# Patient Record
Sex: MALE | Race: OTHER
[De-identification: names, ages, dates, MRNs, and addresses within clinical notes are randomized per-mention and may not be internally consistent; named-entity substitution may affect disease eponyms.]

---

## 2017-06-27 ENCOUNTER — HOSPITAL ENCOUNTER (OUTPATIENT)
Dept: HOSPITAL 76 - LAB.N | Age: 22
End: 2017-06-27
Attending: NURSE PRACTITIONER
Payer: MEDICAID

## 2017-06-27 DIAGNOSIS — Z13.9: Primary | ICD-10-CM

## 2017-06-27 DIAGNOSIS — F32.9: ICD-10-CM

## 2017-06-27 LAB
ALBUMIN/GLOB SERPL: 1.7 {RATIO} (ref 1–2.2)
ANION GAP SERPL CALCULATED.4IONS-SCNC: 7 MMOL/L (ref 6–13)
BASOPHILS NFR BLD AUTO: 0.1 10^3/UL (ref 0–0.1)
BASOPHILS NFR BLD AUTO: 1.2 %
BILIRUB BLD-MCNC: 1 MG/DL (ref 0.2–1)
BUN SERPL-MCNC: 13 MG/DL (ref 6–20)
CALCIUM UR-MCNC: 9.4 MG/DL (ref 8.5–10.3)
CHLORIDE SERPL-SCNC: 103 MMOL/L (ref 101–111)
CO2 SERPL-SCNC: 28 MMOL/L (ref 21–32)
CREAT SERPLBLD-SCNC: 0.8 MG/DL (ref 0.6–1.2)
EOSINOPHIL # BLD AUTO: 0.1 10^3/UL (ref 0–0.7)
EOSINOPHIL NFR BLD AUTO: 2.5 %
ERYTHROCYTE [DISTWIDTH] IN BLOOD BY AUTOMATED COUNT: 13.5 % (ref 12–15)
GFRSERPLBLD MDRD-ARVRAT: 121 ML/MIN/{1.73_M2} (ref 89–?)
GLOBULIN SER-MCNC: 2.5 G/DL (ref 2.1–4.2)
GLUCOSE SERPL-MCNC: 98 MG/DL (ref 70–100)
HCT VFR BLD AUTO: 43.6 % (ref 42–52)
HGB UR QL STRIP: 14.7 G/DL (ref 14–18)
LYMPHOCYTES # SPEC AUTO: 1.1 10^3/UL (ref 1.5–3.5)
LYMPHOCYTES NFR BLD AUTO: 24.7 %
MCH RBC QN AUTO: 29.5 PG (ref 27–31)
MCHC RBC AUTO-ENTMCNC: 33.6 G/DL (ref 32–36)
MCV RBC AUTO: 87.6 FL (ref 80–94)
MONOCYTES # BLD AUTO: 0.7 10^3/UL (ref 0–1)
MONOCYTES NFR BLD AUTO: 14.6 %
NEUTROPHILS # BLD AUTO: 2.6 10^3/UL (ref 1.5–6.6)
NEUTROPHILS # SNV AUTO: 4.6 X10^3/UL (ref 4.8–10.8)
NEUTROPHILS NFR BLD AUTO: 57 %
NRBC # BLD AUTO: 0.1 /100WBC
PDW BLD AUTO: 8.6 FL (ref 7.4–11.4)
POTASSIUM SERPL-SCNC: 3.8 MMOL/L (ref 3.5–5)
PROT SPEC-MCNC: 6.8 G/DL (ref 6.7–8.2)
RBC MAR: 4.98 10^6/UL (ref 4.7–6.1)
SODIUM SERPLBLD-SCNC: 138 MMOL/L (ref 135–145)
WBC # BLD: 4.6 X10^3/UL

## 2017-06-27 PROCEDURE — 84403 ASSAY OF TOTAL TESTOSTERONE: CPT

## 2017-06-27 PROCEDURE — 80053 COMPREHEN METABOLIC PANEL: CPT

## 2017-06-27 PROCEDURE — 85025 COMPLETE CBC W/AUTO DIFF WBC: CPT

## 2017-06-27 PROCEDURE — 36415 COLL VENOUS BLD VENIPUNCTURE: CPT

## 2017-12-14 ENCOUNTER — HOSPITAL ENCOUNTER (OUTPATIENT)
Dept: HOSPITAL 76 - LAB.N | Age: 22
Discharge: HOME | End: 2017-12-14
Attending: NURSE PRACTITIONER
Payer: MEDICAID

## 2017-12-14 DIAGNOSIS — K62.5: Primary | ICD-10-CM

## 2017-12-14 LAB
BASOPHILS NFR BLD AUTO: 0 10^3/UL (ref 0–0.1)
BASOPHILS NFR BLD AUTO: 0.9 %
EOSINOPHIL # BLD AUTO: 0.1 10^3/UL (ref 0–0.7)
EOSINOPHIL NFR BLD AUTO: 3.1 %
ERYTHROCYTE [DISTWIDTH] IN BLOOD BY AUTOMATED COUNT: 13.7 % (ref 12–15)
HCT VFR BLD AUTO: 39.3 % (ref 42–52)
HGB UR QL STRIP: 13.8 G/DL (ref 14–18)
LYMPHOCYTES # SPEC AUTO: 1.2 10^3/UL (ref 1.5–3.5)
LYMPHOCYTES NFR BLD AUTO: 24.4 %
MCH RBC QN AUTO: 30.2 PG (ref 27–31)
MCHC RBC AUTO-ENTMCNC: 35.1 G/DL (ref 32–36)
MCV RBC AUTO: 86 FL (ref 80–94)
MONOCYTES # BLD AUTO: 0.7 10^3/UL (ref 0–1)
MONOCYTES NFR BLD AUTO: 14.4 %
NEUTROPHILS # BLD AUTO: 2.7 10^3/UL (ref 1.5–6.6)
NEUTROPHILS # SNV AUTO: 4.8 X10^3/UL (ref 4.8–10.8)
NEUTROPHILS NFR BLD AUTO: 57.2 %
NRBC # BLD AUTO: 0 /100WBC
PDW BLD AUTO: 8.4 FL (ref 7.4–11.4)
RBC MAR: 4.57 10^6/UL (ref 4.7–6.1)
WBC # BLD: 4.8 X10^3/UL

## 2017-12-14 PROCEDURE — 36415 COLL VENOUS BLD VENIPUNCTURE: CPT

## 2017-12-14 PROCEDURE — 85025 COMPLETE CBC W/AUTO DIFF WBC: CPT

## 2018-03-05 ENCOUNTER — HOSPITAL ENCOUNTER (OUTPATIENT)
Dept: HOSPITAL 76 - SDS | Age: 23
Discharge: HOME | End: 2018-03-05
Attending: SURGERY
Payer: MEDICAID

## 2018-03-05 VITALS — SYSTOLIC BLOOD PRESSURE: 105 MMHG | DIASTOLIC BLOOD PRESSURE: 67 MMHG

## 2018-03-05 DIAGNOSIS — F84.0: ICD-10-CM

## 2018-03-05 DIAGNOSIS — K64.8: ICD-10-CM

## 2018-03-05 DIAGNOSIS — K62.5: Primary | ICD-10-CM

## 2018-03-05 DIAGNOSIS — K52.89: ICD-10-CM

## 2018-03-05 DIAGNOSIS — F90.9: ICD-10-CM

## 2018-03-05 PROCEDURE — 0DBP8ZX EXCISION OF RECTUM, VIA NATURAL OR ARTIFICIAL OPENING ENDOSCOPIC, DIAGNOSTIC: ICD-10-PCS | Performed by: SURGERY

## 2018-03-05 PROCEDURE — 45380 COLONOSCOPY AND BIOPSY: CPT

## 2018-03-05 PROCEDURE — 0DBK8ZX EXCISION OF ASCENDING COLON, VIA NATURAL OR ARTIFICIAL OPENING ENDOSCOPIC, DIAGNOSTIC: ICD-10-PCS | Performed by: SURGERY

## 2018-06-13 ENCOUNTER — HOSPITAL ENCOUNTER (INPATIENT)
Dept: HOSPITAL 76 - ED | Age: 23
LOS: 2 days | Discharge: HOME | DRG: 386 | End: 2018-06-15
Attending: SURGERY | Admitting: SURGERY
Payer: MEDICAID

## 2018-06-13 DIAGNOSIS — F84.0: ICD-10-CM

## 2018-06-13 DIAGNOSIS — D64.9: ICD-10-CM

## 2018-06-13 DIAGNOSIS — F32.9: ICD-10-CM

## 2018-06-13 DIAGNOSIS — F90.9: ICD-10-CM

## 2018-06-13 DIAGNOSIS — G47.00: ICD-10-CM

## 2018-06-13 DIAGNOSIS — K51.911: Primary | ICD-10-CM

## 2018-06-13 LAB
ALBUMIN DIAFP-MCNC: 2.4 G/DL (ref 3.2–5.5)
ALBUMIN/GLOB SERPL: 0.7 {RATIO} (ref 1–2.2)
ALP SERPL-CCNC: 49 IU/L (ref 42–121)
ALT SERPL W P-5'-P-CCNC: 22 IU/L (ref 10–60)
ANION GAP SERPL CALCULATED.4IONS-SCNC: 5 MMOL/L (ref 6–13)
AST SERPL W P-5'-P-CCNC: 27 IU/L (ref 10–42)
BASOPHILS NFR BLD AUTO: 0.1 10^3/UL (ref 0–0.1)
BASOPHILS NFR BLD AUTO: 0.9 %
BILIRUB BLD-MCNC: 0.6 MG/DL (ref 0.2–1)
BUN SERPL-MCNC: 8 MG/DL (ref 6–20)
CALCIUM UR-MCNC: 8.3 MG/DL (ref 8.5–10.3)
CHLORIDE SERPL-SCNC: 100 MMOL/L (ref 101–111)
CO2 SERPL-SCNC: 29 MMOL/L (ref 21–32)
CREAT SERPLBLD-SCNC: 1 MG/DL (ref 0.6–1.2)
EOSINOPHIL # BLD AUTO: 1.4 10^3/UL (ref 0–0.7)
EOSINOPHIL NFR BLD AUTO: 13.7 %
ERYTHROCYTE [DISTWIDTH] IN BLOOD BY AUTOMATED COUNT: 14.3 % (ref 12–15)
GFRSERPLBLD MDRD-ARVRAT: 93 ML/MIN/{1.73_M2} (ref 89–?)
GLOBULIN SER-MCNC: 3.4 G/DL (ref 2.1–4.2)
GLUCOSE SERPL-MCNC: 91 MG/DL (ref 70–100)
HGB UR QL STRIP: 9.6 G/DL (ref 14–18)
LIPASE SERPL-CCNC: 20 U/L (ref 22–51)
LYMPHOCYTES # SPEC AUTO: 1.4 10^3/UL (ref 1.5–3.5)
LYMPHOCYTES NFR BLD AUTO: 13.7 %
MCH RBC QN AUTO: 28.8 PG (ref 27–31)
MCHC RBC AUTO-ENTMCNC: 33.4 G/DL (ref 32–36)
MCV RBC AUTO: 86.2 FL (ref 80–94)
MONOCYTES # BLD AUTO: 2.1 10^3/UL (ref 0–1)
MONOCYTES NFR BLD AUTO: 20.1 %
NEUTROPHILS # BLD AUTO: 5.4 10^3/UL (ref 1.5–6.6)
NEUTROPHILS # SNV AUTO: 10.5 X10^3/UL (ref 4.8–10.8)
NEUTROPHILS NFR BLD AUTO: 51.6 %
PDW BLD AUTO: 6.9 FL (ref 7.4–11.4)
PLATELET # BLD: 387 10^3/UL (ref 130–450)
PROT SPEC-MCNC: 5.8 G/DL (ref 6.7–8.2)
RBC MAR: 3.33 10^6/UL (ref 4.7–6.1)
SODIUM SERPLBLD-SCNC: 134 MMOL/L (ref 135–145)

## 2018-06-13 PROCEDURE — 99283 EMERGENCY DEPT VISIT LOW MDM: CPT

## 2018-06-13 PROCEDURE — 80053 COMPREHEN METABOLIC PANEL: CPT

## 2018-06-13 PROCEDURE — 96360 HYDRATION IV INFUSION INIT: CPT

## 2018-06-13 PROCEDURE — 86901 BLOOD TYPING SEROLOGIC RH(D): CPT

## 2018-06-13 PROCEDURE — 36415 COLL VENOUS BLD VENIPUNCTURE: CPT

## 2018-06-13 PROCEDURE — 96374 THER/PROPH/DIAG INJ IV PUSH: CPT

## 2018-06-13 PROCEDURE — 86850 RBC ANTIBODY SCREEN: CPT

## 2018-06-13 PROCEDURE — 96376 TX/PRO/DX INJ SAME DRUG ADON: CPT

## 2018-06-13 PROCEDURE — 85025 COMPLETE CBC W/AUTO DIFF WBC: CPT

## 2018-06-13 PROCEDURE — 96361 HYDRATE IV INFUSION ADD-ON: CPT

## 2018-06-13 PROCEDURE — 83690 ASSAY OF LIPASE: CPT

## 2018-06-13 PROCEDURE — 86900 BLOOD TYPING SEROLOGIC ABO: CPT

## 2018-06-13 PROCEDURE — 99284 EMERGENCY DEPT VISIT MOD MDM: CPT

## 2018-06-13 RX ADMIN — SODIUM CHLORIDE, PRESERVATIVE FREE PRN ML: 5 INJECTION INTRAVENOUS at 20:09

## 2018-06-13 RX ADMIN — SODIUM CHLORIDE, PRESERVATIVE FREE PRN ML: 5 INJECTION INTRAVENOUS at 19:18

## 2018-06-13 RX ADMIN — SODIUM CHLORIDE SCH: 9 INJECTION, SOLUTION INTRAVENOUS at 20:09

## 2018-06-13 RX ADMIN — HYDROCORTISONE SODIUM SUCCINATE SCH MG: 100 INJECTION, POWDER, FOR SOLUTION INTRAMUSCULAR; INTRAVENOUS at 20:06

## 2018-06-13 NOTE — ED PHYSICIAN DOCUMENTATION
History of Present Illness





- Stated complaint


Stated Complaint: MALE /RECTAL BLEEDING





- Chief complaint


Chief Complaint: General





- History obtained from


History obtained from: Patient





- History of Present Illness


Timing: Other (22-year-old gentleman with recent diagnosis of ulcerative 

colitis by colonoscopy.  He was on what sounds like a mesalamine type drug and 

it was supposed to be 4 times a day but he really only takes it once a day.  

For the last 2-3 weeks he has had intermittently heavy rectal bleeding without 

significant abdominal pain or nausea.  He is starting to feel weak and dizzy.)





Review of Systems


Ten Systems: 10 systems reviewed and negative


Constitutional: denies: Fever, Chills, Fatigue


GI: reports: Bloody / black stool.  denies: Abdominal Pain, Nausea, Vomiting, 

Diarrhea


: denies: Dysuria





PD PAST MEDICAL HISTORY





- Past Medical History


Cardiovascular: None


Endocrine/Autoimmune: None


Psych: Other





- Past Surgical History


General: Other





- Present Medications


Home Medications: 


 Ambulatory Orders











 Medication  Instructions  Recorded  Confirmed


 


Amphetamine Sulfate [Evekeo] 30 mg PO DAILY 03/05/18 06/13/18


 


Clonidine HCl [Catapres] 0.2 mg PO DAILY 03/05/18 06/13/18


 


Fluoxetine HCl [Prozac] 20 mg PO DAILY 03/05/18 06/13/18


 


risperiDONE [Risperdal] 1 mg PO DAILY 03/05/18 06/13/18


 


Mesalamine [Apriso] 0.375 gm PO DAILY 06/13/18 06/13/18














- Allergies


Allergies/Adverse Reactions: 


 Allergies











Allergy/AdvReac Type Severity Reaction Status Date / Time


 


No Known Drug Allergies Allergy   Verified 03/05/18 07:33














- Family History


Family history: reports: Non contributory





PD ED PE NORMAL





- Vitals


Vital signs reviewed: Yes





- General


General: Alert and oriented X 3, No acute distress





- HEENT


HEENT: PERRL, EOMI





- Neck


Neck: Supple, no meningeal sign, No bony TTP





- Cardiac


Cardiac: No murmur, Other (Tachycardic)





- Respiratory


Respiratory: No respiratory distress, Clear bilaterally





- Abdomen


Abdomen: Soft, Non tender





- Rectal


Rectal: Deferred (But he had just had a bowel movements and in the there is 

brown stool mixed with a significant amount of blood.)





- Back


Back: No CVA TTP, No spinal TTP





- Derm


Derm: Normal color, Warm and dry





- Extremities


Extremities: No edema, No calf tenderness / cord





- Neuro


Neuro: Alert and oriented X 3, Normal speech





Results





- Vitals


Vitals: 


 Vital Signs - 24 hr











  06/13/18





  16:05


 


Temperature 36.7 C


 


Heart Rate 118 H


 


Respiratory 16





Rate 


 


Blood Pressure 103/75


 


O2 Saturation 96








 Oxygen











O2 Source                      Room air

















- Labs


Labs: 


 Laboratory Tests











  06/13/18 06/13/18





  16:30 16:30


 


WBC  10.5 


 


RBC  3.33 L 


 


Hgb  9.6 L 


 


Hct  28.7 L 


 


MCV  86.2 


 


MCH  28.8 


 


MCHC  33.4 


 


RDW  14.3 


 


Plt Count  387 


 


MPV  6.9 L 


 


Neut # (Auto)  5.4 


 


Lymph # (Auto)  1.4 L 


 


Mono # (Auto)  2.1 H 


 


Eos # (Auto)  1.4 H 


 


Baso # (Auto)  0.1 


 


Absolute Nucleated RBC  0.02 


 


Nucleated RBC %  0.1 


 


Sodium   134 L


 


Potassium   3.8


 


Chloride   100 L


 


Carbon Dioxide   29


 


Anion Gap   5.0 L


 


BUN   8


 


Creatinine   1.0


 


Estimated GFR (MDRD)   93


 


Glucose   91


 


Calcium   8.3 L


 


Total Bilirubin   0.6


 


AST   27


 


ALT   22


 


Alkaline Phosphatase   49


 


Total Protein   5.8 L


 


Albumin   2.4 L


 


Globulin   3.4


 


Albumin/Globulin Ratio   0.7 L


 


Lipase   20 L














PD MEDICAL DECISION MAKING





- ED course


ED course: 





As a young man with known ulcerative colitis who has significant rectal 

bleeding and hemoglobin of 9, it was high 13 a few months ago.  Call to the 

surgeon for likely observation at 5 PM.





- Sepsis Event


Vital Signs: 


 Vital Signs - 24 hr











  06/13/18





  16:05


 


Temperature 36.7 C


 


Heart Rate 118 H


 


Respiratory 16





Rate 


 


Blood Pressure 103/75


 


O2 Saturation 96








 Oxygen











O2 Source                      Room air

















Departure





- Departure


Disposition: ED Place in Observation


Clinical Impression: 


 Lower GI bleed





Ulcerative colitis


Qualifiers:


 Ulcerative colitis location: unspecified ulcerative colitis location Digestive 

disease complication type: with rectal bleeding Qualified Code(s): K51.911 - 

Ulcerative colitis, unspecified with rectal bleeding





Condition: Stable

## 2018-06-14 LAB
BASOPHILS NFR BLD AUTO: 0.1 10^3/UL (ref 0–0.1)
BASOPHILS NFR BLD AUTO: 0.7 %
EOSINOPHIL # BLD AUTO: 0.2 10^3/UL (ref 0–0.7)
EOSINOPHIL NFR BLD AUTO: 2.3 %
ERYTHROCYTE [DISTWIDTH] IN BLOOD BY AUTOMATED COUNT: 14.4 % (ref 12–15)
HGB UR QL STRIP: 8.2 G/DL (ref 14–18)
LYMPHOCYTES # SPEC AUTO: 0.8 10^3/UL (ref 1.5–3.5)
LYMPHOCYTES NFR BLD AUTO: 9.4 %
MCH RBC QN AUTO: 28.6 PG (ref 27–31)
MCHC RBC AUTO-ENTMCNC: 33 G/DL (ref 32–36)
MCV RBC AUTO: 86.7 FL (ref 80–94)
MONOCYTES # BLD AUTO: 1.1 10^3/UL (ref 0–1)
MONOCYTES NFR BLD AUTO: 12.3 %
NEUTROPHILS # BLD AUTO: 6.7 10^3/UL (ref 1.5–6.6)
NEUTROPHILS # SNV AUTO: 8.9 X10^3/UL (ref 4.8–10.8)
NEUTROPHILS NFR BLD AUTO: 75.3 %
PDW BLD AUTO: 6.7 FL (ref 7.4–11.4)
PLATELET # BLD: 327 10^3/UL (ref 130–450)
RBC MAR: 2.87 10^6/UL (ref 4.7–6.1)

## 2018-06-14 RX ADMIN — HYDROCORTISONE SODIUM SUCCINATE SCH MG: 100 INJECTION, POWDER, FOR SOLUTION INTRAMUSCULAR; INTRAVENOUS at 10:33

## 2018-06-14 RX ADMIN — SODIUM CHLORIDE, PRESERVATIVE FREE PRN ML: 5 INJECTION INTRAVENOUS at 03:27

## 2018-06-14 RX ADMIN — HYDROCORTISONE SODIUM SUCCINATE SCH MG: 100 INJECTION, POWDER, FOR SOLUTION INTRAMUSCULAR; INTRAVENOUS at 03:26

## 2018-06-14 RX ADMIN — SODIUM CHLORIDE, PRESERVATIVE FREE SCH: 5 INJECTION INTRAVENOUS at 09:36

## 2018-06-14 RX ADMIN — SODIUM CHLORIDE, PRESERVATIVE FREE SCH ML: 5 INJECTION INTRAVENOUS at 17:52

## 2018-06-14 RX ADMIN — HYDROCORTISONE SODIUM SUCCINATE SCH MG: 100 INJECTION, POWDER, FOR SOLUTION INTRAMUSCULAR; INTRAVENOUS at 17:52

## 2018-06-14 RX ADMIN — FLUOXETINE SCH MG: 10 CAPSULE ORAL at 09:36

## 2018-06-14 RX ADMIN — SODIUM CHLORIDE, PRESERVATIVE FREE SCH: 5 INJECTION INTRAVENOUS at 01:35

## 2018-06-14 RX ADMIN — SODIUM CHLORIDE SCH MLS/HR: 9 INJECTION, SOLUTION INTRAVENOUS at 12:17

## 2018-06-14 RX ADMIN — FERROUS SULFATE TAB 325 MG (65 MG ELEMENTAL FE) SCH MG: 325 (65 FE) TAB at 09:35

## 2018-06-14 RX ADMIN — SODIUM CHLORIDE SCH MLS/HR: 9 INJECTION, SOLUTION INTRAVENOUS at 20:48

## 2018-06-14 RX ADMIN — SODIUM CHLORIDE SCH MLS/HR: 9 INJECTION, SOLUTION INTRAVENOUS at 03:27

## 2018-06-14 NOTE — HISTORY & PHYSICAL EXAMINATION
DATE OF SERVICE: 06/13/2018

Physician: Eren Pepper MD

 

REASON FOR ADMISSION:  Moderate inflammatory bowel disease.

 

HISTORY OF PRESENT ILLNESS:  The patient is a 22-year-old male who was diagnosed with 

inflammatory disease or inflammatory bowel disease a little over 2 months ago by colonoscopy.  

He had been placed on mesalamine, but his dose has been reduced.  He now has progressive bloody

diarrhea and abdominal pain for the last several weeks.  He was seen in the emergency room and 

found to be anemic and dehydrated, having a hemoglobin of 10 and tachycardic at 120.

 

PAST MEDICAL HISTORY

1.   Inflammatory bowel disease.

2.  ADHD.

3.  Autism.

4.  Depression.

 

PAST SURGICAL HISTORY:  Hernia repair.

 

FAMILY HISTORY:  Possible relative with celiac disease.

 

MEDICATIONS

1.  Risperdal.

2.  Clonidine

3.  Fluoxetine.

4.  Adderall.

 

HABITS:  The patient denies any smoking, alcohol, or drug use.

 

SOCIAL HISTORY:  Patient lives with family.

 

REVIEW OF SYSTEMS

GASTROINTESTINAL:  Abdominal pain and bloody diarrhea.

CARDIOVASCULAR:  Tachycardia.

 

A 12-point review of systems was obtained, with pertinent positives discussed and all others 

being negative.

 

PHYSICAL EXAMINATION

VITAL SIGNS:  Temperature is 36.7, heart rate 118, blood pressure 103/75.

GENERAL:  The patient is lying in bed.  He does not appear to be in any distress at the current

time just complaining of mild abdominal discomfort.

EYES:  Nonicteric.

NECK:  No lymphadenopathy.

HEART:  Mildly tachycardic.

LUNGS:  Clear.

BACK:  Nontender.

ABDOMEN:  Soft.  Minimal tenderness.  No hernias.  No peritoneal signs.

EXTREMITIES:  No edema or cyanosis.

NEUROLOGIC:  The patient appears to be neurologically intact without any deficit.

PSYCHOLOGICAL:  The patient is coherent, cooperative, and appears to answer questions fully.

 

DIAGNOSTIC DATA:  Hemoglobin of 10, down from 14 in the past.  Creatinine 0.1, sodium 134.

 

ASSESSMENT

1.  History of inflammatory bowel disease with the patient appearing to go into remission on 

mesalamine.  His dose had been reduced with return of his symptoms.  I recommend the patient be

started on steroids, as he has moderate inflammatory bowel disease at the current time.  I have

discussed the risks and possible complications of steroids. He would be treated for several 

days on IV hydrocortisone and then switched to oral prednisone and mesalamine.

2.  Attention deficit hyperactivity disorder, currently on medications.

3.  Insomnia, on clonidine.

 

PLAN

1.  Admit.

2.  Clear liquids.

3.  Hydrocortisone 100 mg q.8 hours.

4.  Follow hemoglobin and hematocrit.

5.  IV hydration.

 

 

DD: 06/14/2018 01:18

TD: 06/14/2018 01:26

Job #: [id number]

## 2018-06-14 NOTE — PROVIDER PROGRESS NOTE
Subjective





- General


Admit Date: 06/13/18





- Review of Systems


Gastrointestinal: positive: Hematochezia, Other (no c/o abdominal pain.)





Objective





- Patient Data


Vital Signs: 





 Vital Signs x48h











  Temp Pulse Resp BP Pulse Ox


 


 06/14/18 08:13  37.3 C  96  18  112/60  97


 


 06/14/18 05:00  36.9 C  89  20  108/49 L  96











Weight: 





 Weight











 06/12/18 06/13/18 06/14/18





 23:59 23:59 23:59


 


Weight (kg)  97.5 kg 











Intake & Output: 





 Intake and Output Totals x24h











 06/12/18 06/13/18 06/14/18





 23:59 23:59 23:59


 


Intake Total  1810 200


 


Output Total   1


 


Balance  1810 199














- Lab Results


Lab Results: 


 06/14/18 05:15





 06/13/18 16:30


Other Lab Results: 





 Lab Results x24hrs











  06/14/18 Range/Units





  05:15 


 


WBC  8.9  (4.8-10.8)  x10^3/uL


 


RBC  2.87 L  (4.70-6.10)  10^6/uL


 


Hgb  8.2 L  (14.0-18.0)  g/dL


 


Hct  24.9 L  (42.0-52.0)  %


 


MCV  86.7  (80.0-94.0)  fL


 


MCH  28.6  (27.0-31.0)  pg


 


MCHC  33.0  (32.0-36.0)  g/dL


 


RDW  14.4  (12.0-15.0)  %


 


Plt Count  327  (130-450)  10^3/uL


 


MPV  6.7 L  (7.4-11.4)  fL


 


Neut # (Auto)  6.7 H  (1.5-6.6)  10^3/uL


 


Lymph # (Auto)  0.8 L  (1.5-3.5)  10^3/uL


 


Mono # (Auto)  1.1 H  (0.0-1.0)  10^3/uL


 


Eos # (Auto)  0.2  (0.0-0.7)  10^3/uL


 


Baso # (Auto)  0.1  (0.0-0.1)  10^3/uL


 


Absolute Nucleated RBC  0.01  x10^3/uL


 


Nucleated RBC %  0.1  /100WBC














- Current Medications


Current Medications: 





 Current Medications











Generic Name Dose Route Start Last Admin





  Trade Name Freq  PRN Reason Stop Dose Admin


 


Hydrocortisone Sodium Succinate  100 mg  06/13/18 18:00  06/14/18 03:26





  Solu-Cortef  IVP   100 mg





  Q8H [place]   Administration


 


Sodium Chloride  1,000 mls @ 125 mls/hr  06/13/18 18:00  06/14/18 03:27





  Normal Saline 0.9%  IV   125 mls/hr





  .Q8H [place]   Administration


 


Risperidone  1 mg  06/13/18 21:00  06/13/18 21:15





  Risperdal  PO   1 mg





  QPM [place]   Administration


 


Sodium Chloride  10 ml  06/14/18 01:00  06/14/18 01:35





  Normal Saline Flush 0.9%  IVP   Not Given





  0100,0900,1700 [place]   


 


Sodium Chloride  10 ml  06/13/18 17:41  06/14/18 03:27





  Normal Saline Flush 0.9%  IVP   10 ml





  PRN PRN   Administration





  AS NEEDED PER PROVIDER ORDERS   














- Physical Exam


Abdomen: positive: Non-tender





Impression/Plan





- Problem List


Problem List: 





Inflammatory bowel disease flareup.  Currently on IV steroids.  If improves 

overnight will d/c home on prednisone.





Anemia secondary to ibs.  HGB 8.  asymptomatic.  Start iron

## 2018-06-15 VITALS — DIASTOLIC BLOOD PRESSURE: 68 MMHG | SYSTOLIC BLOOD PRESSURE: 112 MMHG

## 2018-06-15 LAB
BASOPHILS NFR BLD AUTO: 0 10^3/UL (ref 0–0.1)
BASOPHILS NFR BLD AUTO: 0.4 %
EOSINOPHIL # BLD AUTO: 0.1 10^3/UL (ref 0–0.7)
EOSINOPHIL NFR BLD AUTO: 1.6 %
ERYTHROCYTE [DISTWIDTH] IN BLOOD BY AUTOMATED COUNT: 14.6 % (ref 12–15)
HGB UR QL STRIP: 8.1 G/DL (ref 14–18)
LYMPHOCYTES # SPEC AUTO: 0.6 10^3/UL (ref 1.5–3.5)
LYMPHOCYTES NFR BLD AUTO: 6.7 %
MCH RBC QN AUTO: 28.3 PG (ref 27–31)
MCHC RBC AUTO-ENTMCNC: 32.9 G/DL (ref 32–36)
MCV RBC AUTO: 86.1 FL (ref 80–94)
MONOCYTES # BLD AUTO: 1.5 10^3/UL (ref 0–1)
MONOCYTES NFR BLD AUTO: 16.7 %
NEUTROPHILS # BLD AUTO: 6.8 10^3/UL (ref 1.5–6.6)
NEUTROPHILS # SNV AUTO: 9.1 X10^3/UL (ref 4.8–10.8)
NEUTROPHILS NFR BLD AUTO: 74.6 %
PDW BLD AUTO: 6.9 FL (ref 7.4–11.4)
PLATELET # BLD: 366 10^3/UL (ref 130–450)
RBC MAR: 2.84 10^6/UL (ref 4.7–6.1)

## 2018-06-15 RX ADMIN — SODIUM CHLORIDE SCH MLS/HR: 9 INJECTION, SOLUTION INTRAVENOUS at 04:35

## 2018-06-15 RX ADMIN — SODIUM CHLORIDE, PRESERVATIVE FREE SCH: 5 INJECTION INTRAVENOUS at 01:42

## 2018-06-15 RX ADMIN — FLUOXETINE SCH MG: 10 CAPSULE ORAL at 08:50

## 2018-06-15 RX ADMIN — HYDROCORTISONE SODIUM SUCCINATE SCH MG: 100 INJECTION, POWDER, FOR SOLUTION INTRAMUSCULAR; INTRAVENOUS at 01:38

## 2018-06-15 RX ADMIN — FERROUS SULFATE TAB 325 MG (65 MG ELEMENTAL FE) SCH MG: 325 (65 FE) TAB at 08:50

## 2018-06-15 RX ADMIN — HYDROCORTISONE SODIUM SUCCINATE SCH MG: 100 INJECTION, POWDER, FOR SOLUTION INTRAMUSCULAR; INTRAVENOUS at 10:40

## 2018-06-15 RX ADMIN — SODIUM CHLORIDE, PRESERVATIVE FREE SCH: 5 INJECTION INTRAVENOUS at 08:50

## 2018-06-15 NOTE — DISCHARGE SUMMARY
Physician: Eren Pepper MD

DATE OF ADMISSION: 06/13/2018

DATE OF DISCHARGE:  06/15/2018

 

REASON FOR ADMISSION:  Inflammatory bowel flareup.

 

HISTORY OF PRESENT ILLNESS:  Patient is a 22-year-old male who was diagnosed 
with 

inflammatory bowel 3 months prior.  Now presents with a month history of 
continued bloody 

diarrhea after reducing the dose of mesalamine.

 

PRINCIPAL DIAGNOSIS:  Inflammatory bowel flareup.

 

OTHER MEDICAL PROBLEMS

1.  ADHD.

2.  Autism.

3.  Depression.

 

HOSPITAL COURSE:  Patient was admitted to the hospital.  He was diagnosed with 
moderate to 

severe inflammatory bowel disease.  He was having bloody diarrhea.  His 
hemoglobin went down to

8 after hydration.  He was started on IV steroids, hydrocortisone 100 mg q.8 
hours.  Within 24

hours, his bloody diarrhea had almost resolved, and 48 hours later was not 
having any more 

blood in the stool.  However, he is still having some loose stools.  He was 
placed on a regular

diet, which he tolerated.  He did not have any abdominal symptoms.  He was then 
discharged home

in stable condition.

 

DISCHARGE PROGRAM

1.  Patient will be on a prednisone taper 40 mg daily for a week, tapering down 
to 20 mg.

2.  He will restart his mesalamine in a couple weeks and will need to be on 
maintenance 

     therapy.

3.  He is to resume his prehospitalization medications.

4.  He will follow up with his primary care doctor in 2 weeks and Dr. Flood 
in 3 weeks.

5.  He is to come in if he should have any other issues occurring.

 

 

DD: 06/15/2018 11:58

TD: 06/15/2018 12:03

Job #: [id number]

LILIAN

## 2018-06-28 ENCOUNTER — HOSPITAL ENCOUNTER (EMERGENCY)
Dept: HOSPITAL 76 - ED | Age: 23
Discharge: ANOTHER HOSPITAL | End: 2018-06-28
Payer: MEDICAID

## 2018-06-28 ENCOUNTER — HOSPITAL ENCOUNTER (OUTPATIENT)
Dept: HOSPITAL 76 - EMS | Age: 23
Discharge: ANOTHER HOSPITAL | End: 2018-06-28
Attending: SURGERY
Payer: MEDICAID

## 2018-06-28 VITALS — SYSTOLIC BLOOD PRESSURE: 114 MMHG | DIASTOLIC BLOOD PRESSURE: 71 MMHG

## 2018-06-28 DIAGNOSIS — K51.011: ICD-10-CM

## 2018-06-28 DIAGNOSIS — D64.9: ICD-10-CM

## 2018-06-28 DIAGNOSIS — K92.2: Primary | ICD-10-CM

## 2018-06-28 DIAGNOSIS — D64.9: Primary | ICD-10-CM

## 2018-06-28 LAB
ALBUMIN DIAFP-MCNC: 2.1 G/DL (ref 3.2–5.5)
ALBUMIN/GLOB SERPL: 0.7 {RATIO} (ref 1–2.2)
ALP SERPL-CCNC: 43 IU/L (ref 42–121)
ALT SERPL W P-5'-P-CCNC: 14 IU/L (ref 10–60)
ANION GAP SERPL CALCULATED.4IONS-SCNC: 5 MMOL/L (ref 6–13)
AST SERPL W P-5'-P-CCNC: 15 IU/L (ref 10–42)
BILIRUB BLD-MCNC: 0.5 MG/DL (ref 0.2–1)
BUN SERPL-MCNC: 13 MG/DL (ref 6–20)
CALCIUM UR-MCNC: 7.8 MG/DL (ref 8.5–10.3)
CHLORIDE SERPL-SCNC: 99 MMOL/L (ref 101–111)
CO2 SERPL-SCNC: 28 MMOL/L (ref 21–32)
CREAT SERPLBLD-SCNC: 0.9 MG/DL (ref 0.6–1.2)
ERYTHROCYTE [DISTWIDTH] IN BLOOD BY AUTOMATED COUNT: 17.3 % (ref 12–15)
GFRSERPLBLD MDRD-ARVRAT: 105 ML/MIN/{1.73_M2} (ref 89–?)
GLOBULIN SER-MCNC: 3.2 G/DL (ref 2.1–4.2)
GLUCOSE SERPL-MCNC: 111 MG/DL (ref 70–100)
HGB UR QL STRIP: 6.6 G/DL (ref 14–18)
INR PPP: 1.2 (ref 0.8–1.2)
LIPASE SERPL-CCNC: 18 U/L (ref 22–51)
MCH RBC QN AUTO: 25.3 PG (ref 27–31)
MCHC RBC AUTO-ENTMCNC: 32.2 G/DL (ref 32–36)
MCV RBC AUTO: 78.7 FL (ref 80–94)
NEUTROPHILS # SNV AUTO: 10.9 X10^3/UL (ref 4.8–10.8)
PDW BLD AUTO: 6.6 FL (ref 7.4–11.4)
PLATELET # BLD: 472 10^3/UL (ref 130–450)
PROT SPEC-MCNC: 5.3 G/DL (ref 6.7–8.2)
PROTHROM ACT/NOR PPP: 13 SECS (ref 9.9–12.6)
RBC MAR: 2.61 10^6/UL (ref 4.7–6.1)
SODIUM SERPLBLD-SCNC: 132 MMOL/L (ref 135–145)

## 2018-06-28 PROCEDURE — 96361 HYDRATE IV INFUSION ADD-ON: CPT

## 2018-06-28 PROCEDURE — 86850 RBC ANTIBODY SCREEN: CPT

## 2018-06-28 PROCEDURE — 86900 BLOOD TYPING SEROLOGIC ABO: CPT

## 2018-06-28 PROCEDURE — 86920 COMPATIBILITY TEST SPIN: CPT

## 2018-06-28 PROCEDURE — 80053 COMPREHEN METABOLIC PANEL: CPT

## 2018-06-28 PROCEDURE — 36415 COLL VENOUS BLD VENIPUNCTURE: CPT

## 2018-06-28 PROCEDURE — 99285 EMERGENCY DEPT VISIT HI MDM: CPT

## 2018-06-28 PROCEDURE — 85027 COMPLETE CBC AUTOMATED: CPT

## 2018-06-28 PROCEDURE — 83690 ASSAY OF LIPASE: CPT

## 2018-06-28 PROCEDURE — 85730 THROMBOPLASTIN TIME PARTIAL: CPT

## 2018-06-28 PROCEDURE — 86901 BLOOD TYPING SEROLOGIC RH(D): CPT

## 2018-06-28 PROCEDURE — 96374 THER/PROPH/DIAG INJ IV PUSH: CPT

## 2018-06-28 PROCEDURE — 36430 TRANSFUSION BLD/BLD COMPNT: CPT

## 2018-06-28 PROCEDURE — 99284 EMERGENCY DEPT VISIT MOD MDM: CPT

## 2018-06-28 PROCEDURE — 85610 PROTHROMBIN TIME: CPT

## 2018-07-30 ENCOUNTER — HOSPITAL ENCOUNTER (EMERGENCY)
Dept: HOSPITAL 76 - ED | Age: 23
Discharge: ANOTHER HOSPITAL | End: 2018-07-30
Payer: MEDICAID

## 2018-07-30 ENCOUNTER — HOSPITAL ENCOUNTER (OUTPATIENT)
Dept: HOSPITAL 76 - EMS | Age: 23
Discharge: ANOTHER HOSPITAL | End: 2018-07-30
Attending: SURGERY
Payer: MEDICAID

## 2018-07-30 VITALS — DIASTOLIC BLOOD PRESSURE: 76 MMHG | SYSTOLIC BLOOD PRESSURE: 114 MMHG

## 2018-07-30 DIAGNOSIS — K92.2: Primary | ICD-10-CM

## 2018-07-30 DIAGNOSIS — K51.919: ICD-10-CM

## 2018-07-30 DIAGNOSIS — D64.9: ICD-10-CM

## 2018-07-30 LAB
ALBUMIN DIAFP-MCNC: 2.6 G/DL (ref 3.2–5.5)
ALBUMIN/GLOB SERPL: 0.8 {RATIO} (ref 1–2.2)
ALP SERPL-CCNC: 43 IU/L (ref 42–121)
ALT SERPL W P-5'-P-CCNC: 14 IU/L (ref 10–60)
ANION GAP SERPL CALCULATED.4IONS-SCNC: 3 MMOL/L (ref 6–13)
AST SERPL W P-5'-P-CCNC: 15 IU/L (ref 10–42)
BILIRUB BLD-MCNC: 0.8 MG/DL (ref 0.2–1)
BUN SERPL-MCNC: 7 MG/DL (ref 6–20)
CALCIUM UR-MCNC: 8.2 MG/DL (ref 8.5–10.3)
CHLORIDE SERPL-SCNC: 102 MMOL/L (ref 101–111)
CO2 SERPL-SCNC: 30 MMOL/L (ref 21–32)
CREAT SERPLBLD-SCNC: 0.9 MG/DL (ref 0.6–1.2)
ERYTHROCYTE [DISTWIDTH] IN BLOOD BY AUTOMATED COUNT: 24.2 % (ref 12–15)
GFRSERPLBLD MDRD-ARVRAT: 105 ML/MIN/{1.73_M2} (ref 89–?)
GLOBULIN SER-MCNC: 3.3 G/DL (ref 2.1–4.2)
GLUCOSE SERPL-MCNC: 88 MG/DL (ref 70–100)
HGB UR QL STRIP: 7.8 G/DL (ref 14–18)
INR PPP: 1 (ref 0.8–1.2)
LIPASE SERPL-CCNC: 19 U/L (ref 22–51)
MCH RBC QN AUTO: 23.6 PG (ref 27–31)
MCHC RBC AUTO-ENTMCNC: 31 G/DL (ref 32–36)
MCV RBC AUTO: 76.1 FL (ref 80–94)
NEUTROPHILS # SNV AUTO: 9 X10^3/UL (ref 4.8–10.8)
PDW BLD AUTO: 6.8 FL (ref 7.4–11.4)
PLATELET # BLD: 337 10^3/UL (ref 130–450)
PROT SPEC-MCNC: 5.9 G/DL (ref 6.7–8.2)
PROTHROM ACT/NOR PPP: 11.5 SECS (ref 9.9–12.6)
RBC MAR: 3.31 10^6/UL (ref 4.7–6.1)
SODIUM SERPLBLD-SCNC: 135 MMOL/L (ref 135–145)

## 2018-07-30 PROCEDURE — 96360 HYDRATION IV INFUSION INIT: CPT

## 2018-07-30 PROCEDURE — 85730 THROMBOPLASTIN TIME PARTIAL: CPT

## 2018-07-30 PROCEDURE — 86901 BLOOD TYPING SEROLOGIC RH(D): CPT

## 2018-07-30 PROCEDURE — 99284 EMERGENCY DEPT VISIT MOD MDM: CPT

## 2018-07-30 PROCEDURE — 85027 COMPLETE CBC AUTOMATED: CPT

## 2018-07-30 PROCEDURE — 86900 BLOOD TYPING SEROLOGIC ABO: CPT

## 2018-07-30 PROCEDURE — 80053 COMPREHEN METABOLIC PANEL: CPT

## 2018-07-30 PROCEDURE — 36415 COLL VENOUS BLD VENIPUNCTURE: CPT

## 2018-07-30 PROCEDURE — 86850 RBC ANTIBODY SCREEN: CPT

## 2018-07-30 PROCEDURE — 86920 COMPATIBILITY TEST SPIN: CPT

## 2018-07-30 PROCEDURE — 85610 PROTHROMBIN TIME: CPT

## 2018-07-30 PROCEDURE — 83690 ASSAY OF LIPASE: CPT

## 2018-07-30 PROCEDURE — 36430 TRANSFUSION BLD/BLD COMPNT: CPT

## 2018-07-30 NOTE — ED PHYSICIAN DOCUMENTATION
History of Present Illness





- Stated complaint


Stated Complaint: SENT BY 





- Chief complaint


Chief Complaint: General





- Additonal information


Additional information: 





hx from pt and EMRs


22 male


ulcerative colitis


was admitted to [facility] 6/14-15 for bloody stools and improved with IV steroids and 

was dced


then returned 6/28 and had a HGB of 6.6 and was transferred to [place]


at [place] he had CTs and EGD and colonoscopy which showed a large mass that was 

not malignant / was due to UC, he was transfused, was dx with c diff which was 

believed to have triggered a UC flare and he was dced with a hgb of 8.6, dc on 7 /14 on vano 125 daily for 2 more weeks and mesalamine and prednisone 


he started dec steroids several days ago and finished his once daily vanco today


having inc blood in BMS


he followed up with PMD today and was weak and tied and his hgb was 7.7 so she 

sent him back to the ED at [facility]








Review of Systems


Constitutional: denies: Fever, Chills


Cardiac: denies: Chest pain / pressure


Respiratory: denies: Dyspnea


GI: reports: Diarrhea, Bloody / black stool.  denies: Abdominal Pain





PD PAST MEDICAL HISTORY





- Past Medical History


Past Medical History: Yes


Cardiovascular: None


Respiratory: None


Endocrine/Autoimmune: None


GI: Ulcerative colitis


Psych: ADD/ADHD, Other


Derm: None





- Past Surgical History


Past Surgical History: Yes


General: Other





- Present Medications


Home Medications: 


 Ambulatory Orders











 Medication  Instructions  Recorded  Confirmed


 


Clonidine HCl [Catapres] 0.2 mg PO QPM 03/05/18 06/13/18


 


Fluoxetine HCl [Prozac] 20 mg PO DAILY 03/05/18 06/13/18


 


risperiDONE [Risperdal] 1 mg PO QPM 03/05/18 06/13/18


 


Dextroamphetamine/Amphetamine 20 mg PO .DAILY AT 4PM 06/13/18 06/13/18





[Dextroamp-Amphet ER 20 mg Cap]   


 


Dextroamphetamine/Amphetamine 30 mg PO .DAILY AT 10AM 06/13/18 06/13/18





[Dextroamp-Amphet ER 30 mg Cap]   


 


Mesalamine [Pentasa] 250 mg PO 07/30/18 














- Allergies


Allergies/Adverse Reactions: 


 Allergies











Allergy/AdvReac Type Severity Reaction Status Date / Time


 


No Known Drug Allergies Allergy   Verified 07/30/18 15:26














- Social History


Does the pt smoke?: No


Smoking Status: Never smoker


Does the pt drink ETOH?: No


Does the pt have substance abuse?: No





- Immunizations


Immunizations are current?: Yes





- POLST


Patient has POLST: No





PD ED PE NORMAL





- Vitals


Vital signs reviewed: Yes





- Neck


Neck: Supple, no meningeal sign





- Cardiac


Cardiac: RRR





- Respiratory


Respiratory: No respiratory distress, Clear bilaterally





- Abdomen


Abdomen: Non tender





- Derm


Derm: Normal color





- Neuro


Neuro: Alert and oriented X 3





Results





- Vitals


Vitals: 


 Vital Signs - 24 hr











  07/30/18 07/30/18 07/30/18





  15:23 16:29 18:42


 


Temperature 36.6 C 37.0 C 36.4 C L


 


Heart Rate 98 94 71


 


Respiratory 16 16 16





Rate   


 


Blood Pressure 114/70 127/83 H 127/66


 


O2 Saturation 99 99 














  07/30/18





  19:01


 


Temperature 36.7 C


 


Heart Rate 79


 


Respiratory 16





Rate 


 


Blood Pressure 120/66


 


O2 Saturation 








 Oxygen











O2 Source                      Room air

















- Labs


Labs: 


 Laboratory Tests











  07/30/18 07/30/18 07/30/18





  15:45 15:45 15:45


 


WBC   9.0 


 


RBC   3.31 L 


 


Hgb   7.8 L 


 


Hct   25.2 L 


 


MCV   76.1 L 


 


MCH   23.6 L 


 


MCHC   31.0 L 


 


RDW   24.2 H 


 


Plt Count   337 


 


MPV   6.8 L 


 


PT    11.5


 


INR    1.0


 


APTT    28.0


 


Sodium   


 


Potassium   


 


Chloride   


 


Carbon Dioxide   


 


Anion Gap   


 


BUN   


 


Creatinine   


 


Estimated GFR (MDRD)   


 


Glucose   


 


Calcium   


 


Total Bilirubin   


 


AST   


 


ALT   


 


Alkaline Phosphatase   


 


Total Protein   


 


Albumin   


 


Globulin   


 


Albumin/Globulin Ratio   


 


Lipase   


 


Blood Type  O POSITIVE  


 


Antibody Screen  NEGATIVE  


 


Crossmatch IS Only   














  07/30/18 07/30/18





  15:45 15:45


 


WBC  


 


RBC  


 


Hgb  


 


Hct  


 


MCV  


 


MCH  


 


MCHC  


 


RDW  


 


Plt Count  


 


MPV  


 


PT  


 


INR  


 


APTT  


 


Sodium  135 


 


Potassium  3.9 


 


Chloride  102 


 


Carbon Dioxide  30 


 


Anion Gap  3.0 L 


 


BUN  7 


 


Creatinine  0.9 


 


Estimated GFR (MDRD)  105 


 


Glucose  88 


 


Calcium  8.2 L 


 


Total Bilirubin  0.8 


 


AST  15 


 


ALT  14 


 


Alkaline Phosphatase  43 


 


Total Protein  5.9 L 


 


Albumin  2.6 L 


 


Globulin  3.3 


 


Albumin/Globulin Ratio  0.8 L 


 


Lipase  19 L 


 


Blood Type   Cancelled


 


Antibody Screen   Cancelled


 


Crossmatch IS Only   See Detail














PD MEDICAL DECISION MAKING





- ED course


ED course: 





MSE performed





22 male with severe UC and recently discovered non malignant mass 2/2 UC and c 

diff that may still persist after vanco QD tx





not stable for dc





will need transfer back to facility with GI





called [place] and hospitalist Dr Rogers accepts





to stabilize for safe transport, transfusion was started prior to transfer





- Sepsis Event


Vital Signs: 


 Vital Signs - 24 hr











  07/30/18 07/30/18 07/30/18





  15:23 16:29 18:42


 


Temperature 36.6 C 37.0 C 36.4 C L


 


Heart Rate 98 94 71


 


Respiratory 16 16 16





Rate   


 


Blood Pressure 114/70 127/83 H 127/66


 


O2 Saturation 99 99 














  07/30/18





  19:01


 


Temperature 36.7 C


 


Heart Rate 79


 


Respiratory 16





Rate 


 


Blood Pressure 120/66


 


O2 Saturation 








 Oxygen











O2 Source                      Room air

















Departure





- Departure


Disposition: 02 Transfer Acute Care Hosp


Clinical Impression: 


 Lower GI bleed, Symptomatic anemia





Ulcerative colitis


Qualifiers:


 Ulcerative colitis location: unspecified ulcerative colitis location Digestive 

disease complication type: unspecified complication Qualified Code(s): K51.919 

- Ulcerative colitis, unspecified with unspecified complications





Condition: Fair

## 2018-08-26 ENCOUNTER — HOSPITAL ENCOUNTER (EMERGENCY)
Dept: HOSPITAL 76 - ED | Age: 23
Discharge: HOME | End: 2018-08-26
Payer: MEDICAID

## 2018-08-26 VITALS — SYSTOLIC BLOOD PRESSURE: 153 MMHG | DIASTOLIC BLOOD PRESSURE: 90 MMHG

## 2018-08-26 DIAGNOSIS — K51.811: Primary | ICD-10-CM

## 2018-08-26 LAB
ALBUMIN DIAFP-MCNC: 2.9 G/DL (ref 3.2–5.5)
ALBUMIN/GLOB SERPL: 1 {RATIO} (ref 1–2.2)
ALP SERPL-CCNC: 44 IU/L (ref 42–121)
ALT SERPL W P-5'-P-CCNC: 12 IU/L (ref 10–60)
ANION GAP SERPL CALCULATED.4IONS-SCNC: 7 MMOL/L (ref 6–13)
AST SERPL W P-5'-P-CCNC: 14 IU/L (ref 10–42)
BILIRUB BLD-MCNC: 0.6 MG/DL (ref 0.2–1)
BUN SERPL-MCNC: 9 MG/DL (ref 6–20)
CALCIUM UR-MCNC: 8.4 MG/DL (ref 8.5–10.3)
CHLORIDE SERPL-SCNC: 97 MMOL/L (ref 101–111)
CO2 SERPL-SCNC: 30 MMOL/L (ref 21–32)
CREAT SERPLBLD-SCNC: 1.1 MG/DL (ref 0.6–1.2)
ERYTHROCYTE [DISTWIDTH] IN BLOOD BY AUTOMATED COUNT: 23 % (ref 12–15)
GFRSERPLBLD MDRD-ARVRAT: 83 ML/MIN/{1.73_M2} (ref 89–?)
GLOBULIN SER-MCNC: 3 G/DL (ref 2.1–4.2)
GLUCOSE SERPL-MCNC: 97 MG/DL (ref 70–100)
HGB UR QL STRIP: 8.1 G/DL (ref 14–18)
INR PPP: 1 (ref 0.8–1.2)
LIPASE SERPL-CCNC: 27 U/L (ref 22–51)
MCH RBC QN AUTO: 22.3 PG (ref 27–31)
MCHC RBC AUTO-ENTMCNC: 32.3 G/DL (ref 32–36)
MCV RBC AUTO: 69.2 FL (ref 80–94)
NEUTROPHILS # SNV AUTO: 7.1 X10^3/UL (ref 4.8–10.8)
PDW BLD AUTO: 6.5 FL (ref 7.4–11.4)
PLATELET # BLD: 414 10^3/UL (ref 130–450)
PROT SPEC-MCNC: 5.9 G/DL (ref 6.7–8.2)
PROTHROM ACT/NOR PPP: 11.7 SECS (ref 9.9–12.6)
RBC MAR: 3.62 10^6/UL (ref 4.7–6.1)
SODIUM SERPLBLD-SCNC: 134 MMOL/L (ref 135–145)

## 2018-08-26 PROCEDURE — 85027 COMPLETE CBC AUTOMATED: CPT

## 2018-08-26 PROCEDURE — 83690 ASSAY OF LIPASE: CPT

## 2018-08-26 PROCEDURE — 86900 BLOOD TYPING SEROLOGIC ABO: CPT

## 2018-08-26 PROCEDURE — 99283 EMERGENCY DEPT VISIT LOW MDM: CPT

## 2018-08-26 PROCEDURE — 80053 COMPREHEN METABOLIC PANEL: CPT

## 2018-08-26 PROCEDURE — 36415 COLL VENOUS BLD VENIPUNCTURE: CPT

## 2018-08-26 PROCEDURE — 85730 THROMBOPLASTIN TIME PARTIAL: CPT

## 2018-08-26 PROCEDURE — 86901 BLOOD TYPING SEROLOGIC RH(D): CPT

## 2018-08-26 PROCEDURE — 86850 RBC ANTIBODY SCREEN: CPT

## 2018-08-26 PROCEDURE — 85610 PROTHROMBIN TIME: CPT

## 2018-08-26 NOTE — ED PHYSICIAN DOCUMENTATION
PD HPI GI BLEED





- Stated complaint


Stated Complaint: GI BLEED





- Chief complaint


Chief Complaint: Abd Pain





- History obtained from


History obtained from: Patient, Family





- History of Present Illness


Timing - onset: Chronic


Timing - duration: Months


Timing - details: Intermittant


Pain level max: 0


Pain level now: 0


Associated symptoms: BRBPR (states improving)


Contributing factors: Other (ulcerative colitis).  No: Sick contact, Bad food, 

Travel, Recent antibiotics, Alcohol use, Aspirin use, NSAID use, Stress, 

Anticoagulated, Diabetes


Improved by: Other (prednisone)


Worsened by: Other (nothing)


Similar symptoms before: Diagnosis (ulcerative colitis)


Recently seen: Other (seen by GI 3 days ago and his PCP last week. Hgb was 8.0 

at that time.)





Review of Systems


Ten Systems: 10 systems reviewed and negative


Constitutional: denies: Fever, Chills


Nose: denies: Rhinorrhea / runny nose, Congestion


Throat: denies: Sore throat


Cardiac: denies: Chest pain / pressure


Respiratory: denies: Cough


: denies: Dysuria


Skin: denies: Rash


Musculoskeletal: denies: Neck pain, Back pain


Neurologic: reports: Generalized weakness.  denies: Syncope, Seizure





PD PAST MEDICAL HISTORY





- Past Medical History


Past Medical History: Yes


Cardiovascular: None


Respiratory: None


Endocrine/Autoimmune: None


GI: Ulcerative colitis


Psych: ADD/ADHD, Other


Derm: None





- Past Surgical History


Past Surgical History: Yes


General: Other





- Present Medications


Home Medications: 


 Ambulatory Orders











 Medication  Instructions  Recorded  Confirmed


 


Clonidine HCl [Catapres] 0.2 mg PO QPM 03/05/18 06/13/18


 


Fluoxetine HCl [Prozac] 20 mg PO DAILY 03/05/18 06/13/18


 


risperiDONE [Risperdal] 1 mg PO QPM 03/05/18 06/13/18


 


Dextroamphetamine/Amphetamine 20 mg PO .DAILY AT 4PM 06/13/18 06/13/18





[Dextroamp-Amphet ER 20 mg Cap]   


 


Dextroamphetamine/Amphetamine 30 mg PO .DAILY AT 10AM 06/13/18 06/13/18





[Dextroamp-Amphet ER 30 mg Cap]   


 


Mesalamine [Pentasa] 250 mg PO 07/30/18 


 


Mesalamine W/Cleansing Wipes 4 gm RC QPM #7 enema.kit 08/26/18 





[Mesalamine 4 gm/60 ml Kit]   


 


predniSONE [Prednisone] 20 mg PO DAILY #14 tablet 08/26/18 














- Allergies


Allergies/Adverse Reactions: 


 Allergies











Allergy/AdvReac Type Severity Reaction Status Date / Time


 


No Known Drug Allergies Allergy   Verified 08/26/18 15:54














- Social History


Does the pt smoke?: No


Smoking Status: Never smoker


Does the pt drink ETOH?: No


Does the pt have substance abuse?: No





- Immunizations


Immunizations are current?: Yes





- POLST


Patient has POLST: No





PD ED PE NORMAL





- Vitals


Vital signs reviewed: Yes





- General


General: Alert and oriented X 3, No acute distress





- HEENT


HEENT: Moist mucous membranes





- Neck


Neck: Supple, no meningeal sign





- Cardiac


Cardiac: RRR, Strong equal pulses





- Respiratory


Respiratory: No respiratory distress, Clear bilaterally





- Abdomen


Abdomen: Soft, Non tender, Non distended





- Derm


Derm: Warm and dry





- Neuro


Neuro: Alert and oriented X 3





- Psych


Psych: Normal mood, Normal affect





Results





- Vitals


Vitals: 


 Vital Signs - 24 hr











  08/26/18 08/26/18





  15:51 17:34


 


Temperature 36.5 C 36.6 C


 


Heart Rate 137 H 108 H


 


Respiratory 20 18





Rate  


 


Blood Pressure 122/79 153/90 H


 


O2 Saturation 98 100








 Oxygen











O2 Source                      Room air

















- Labs


Labs: 


 Laboratory Tests











  08/26/18 08/26/18 08/26/18





  16:20 16:20 16:20


 


WBC   7.1 


 


RBC   3.62 L 


 


Hgb   8.1 L 


 


Hct   25.1 L 


 


MCV   69.2 L 


 


MCH   22.3 L 


 


MCHC   32.3 


 


RDW   23.0 H 


 


Plt Count   414 


 


MPV   6.5 L 


 


PT    11.7


 


INR    1.0


 


APTT    28.8


 


Sodium   


 


Potassium   


 


Chloride   


 


Carbon Dioxide   


 


Anion Gap   


 


BUN   


 


Creatinine   


 


Estimated GFR (MDRD)   


 


Glucose   


 


Calcium   


 


Total Bilirubin   


 


AST   


 


ALT   


 


Alkaline Phosphatase   


 


Total Protein   


 


Albumin   


 


Globulin   


 


Albumin/Globulin Ratio   


 


Lipase   


 


Blood Type  O POSITIVE  


 


Antibody Screen  NEGATIVE  














  08/26/18





  16:20


 


WBC 


 


RBC 


 


Hgb 


 


Hct 


 


MCV 


 


MCH 


 


MCHC 


 


RDW 


 


Plt Count 


 


MPV 


 


PT 


 


INR 


 


APTT 


 


Sodium  134 L


 


Potassium  3.8


 


Chloride  97 L


 


Carbon Dioxide  30


 


Anion Gap  7.0


 


BUN  9


 


Creatinine  1.1


 


Estimated GFR (MDRD)  83 L


 


Glucose  97


 


Calcium  8.4 L


 


Total Bilirubin  0.6


 


AST  14


 


ALT  12


 


Alkaline Phosphatase  44


 


Total Protein  5.9 L


 


Albumin  2.9 L


 


Globulin  3.0


 


Albumin/Globulin Ratio  1.0


 


Lipase  27


 


Blood Type 


 


Antibody Screen 














PD MEDICAL DECISION MAKING





- ED course


Complexity details: reviewed results, re-evaluated patient, considered 

differential, d/w patient, d/w consultant


ED course: 





Patient is a 22-year-old male with a long-standing history of ulcerative 

colitis.  He recently finished his prednisone and feels like his rectal 

bleeding has increased.  His hemoglobin is stable from prior.  He is above 

transfusion levels still.  Is not acutely hypoxic or short of breath.  

Discussed the case with [facility],  who 

recommends restarting on steroids and can trial on mesalamine enemas.  Patient 

is well-appearing, nontoxic.  Patient and family counseled regarding signs and 

symptoms for which I believe and urgent re-evaluation would be necessary. 

Patient with good understanding of and agreement to plan and is comfortable 

going home at this time





This document was made in part using voice recognition software. While efforts 

are made to proofread this document, sound alike and grammatical errors may 

occur.





- Sepsis Event


Vital Signs: 


 Vital Signs - 24 hr











  08/26/18 08/26/18





  15:51 17:34


 


Temperature 36.5 C 36.6 C


 


Heart Rate 137 H 108 H


 


Respiratory 20 18





Rate  


 


Blood Pressure 122/79 153/90 H


 


O2 Saturation 98 100








 Oxygen











O2 Source                      Room air

















Departure





- Departure


Disposition: 01 Home, Self Care


Clinical Impression: 


Ulcerative colitis


Qualifiers:


 Ulcerative colitis location: other ulcerative colitis Digestive disease 

complication type: with rectal bleeding Qualified Code(s): K51.811 - Other 

ulcerative colitis with rectal bleeding





Condition: Good


Instructions:  ED Colitis Ulcerative


Follow-Up: 


Kathy Roberts ARNP [Primary Care Provider] - Within 3 Days


Prescriptions: 


Mesalamine W/Cleansing Wipes [Mesalamine 4 gm/60 ml Kit] 4 gm RC QPM #7 

enema.[email or web address]


predniSONE [Prednisone] 20 mg PO DAILY #14 tablet


Comments: 


We will restart you on the steroids today.  I spoke with GI on-call who 

recommended mesalamine enemas.  This will likely help your bleeding.  Return if 

you worsen


Discharge Date/Time: 08/26/18 17:46

## 2018-08-28 ENCOUNTER — HOSPITAL ENCOUNTER (INPATIENT)
Dept: HOSPITAL 76 - ED | Age: 23
LOS: 1 days | Discharge: HOME | DRG: 386 | End: 2018-08-29
Attending: INTERNAL MEDICINE | Admitting: SPECIALIST
Payer: MEDICAID

## 2018-08-28 ENCOUNTER — HOSPITAL ENCOUNTER (OUTPATIENT)
Dept: HOSPITAL 76 - RT.N | Age: 23
Discharge: HOME | End: 2018-08-28
Attending: NURSE PRACTITIONER
Payer: MEDICAID

## 2018-08-28 DIAGNOSIS — R00.0: Primary | ICD-10-CM

## 2018-08-28 DIAGNOSIS — K51.911: Primary | ICD-10-CM

## 2018-08-28 DIAGNOSIS — F98.8: ICD-10-CM

## 2018-08-28 DIAGNOSIS — K51.911: ICD-10-CM

## 2018-08-28 DIAGNOSIS — F90.9: ICD-10-CM

## 2018-08-28 DIAGNOSIS — F84.0: ICD-10-CM

## 2018-08-28 DIAGNOSIS — D50.0: ICD-10-CM

## 2018-08-28 LAB
ALBUMIN DIAFP-MCNC: 2.7 G/DL (ref 3.2–5.5)
ALBUMIN/GLOB SERPL: 0.9 {RATIO} (ref 1–2.2)
ALP SERPL-CCNC: 41 IU/L (ref 42–121)
ALT SERPL W P-5'-P-CCNC: 13 IU/L (ref 10–60)
ANION GAP SERPL CALCULATED.4IONS-SCNC: 5 MMOL/L (ref 6–13)
AST SERPL W P-5'-P-CCNC: 14 IU/L (ref 10–42)
BASOPHILS NFR BLD AUTO: 0 10^3/UL (ref 0–0.1)
BASOPHILS NFR BLD AUTO: 0.5 %
BILIRUB BLD-MCNC: 0.6 MG/DL (ref 0.2–1)
BUN SERPL-MCNC: 11 MG/DL (ref 6–20)
CALCIUM UR-MCNC: 8.2 MG/DL (ref 8.5–10.3)
CHLORIDE SERPL-SCNC: 101 MMOL/L (ref 101–111)
CO2 SERPL-SCNC: 29 MMOL/L (ref 21–32)
CREAT SERPLBLD-SCNC: 1 MG/DL (ref 0.6–1.2)
EOSINOPHIL # BLD AUTO: 0.3 10^3/UL (ref 0–0.7)
EOSINOPHIL NFR BLD AUTO: 3.3 %
ERYTHROCYTE [DISTWIDTH] IN BLOOD BY AUTOMATED COUNT: 23.2 % (ref 12–15)
GFRSERPLBLD MDRD-ARVRAT: 93 ML/MIN/{1.73_M2} (ref 89–?)
GLOBULIN SER-MCNC: 2.9 G/DL (ref 2.1–4.2)
GLUCOSE SERPL-MCNC: 91 MG/DL (ref 70–100)
HGB UR QL STRIP: 7.6 G/DL (ref 14–18)
LIPASE SERPL-CCNC: 25 U/L (ref 22–51)
LYMPHOCYTES # SPEC AUTO: 1.2 10^3/UL (ref 1.5–3.5)
LYMPHOCYTES NFR BLD AUTO: 16 %
MCH RBC QN AUTO: 22.5 PG (ref 27–31)
MCHC RBC AUTO-ENTMCNC: 32.2 G/DL (ref 32–36)
MCV RBC AUTO: 70.1 FL (ref 80–94)
MONOCYTES # BLD AUTO: 1.8 10^3/UL (ref 0–1)
MONOCYTES NFR BLD AUTO: 23.6 %
NEUTROPHILS # BLD AUTO: 4.4 10^3/UL (ref 1.5–6.6)
NEUTROPHILS # SNV AUTO: 7.8 X10^3/UL (ref 4.8–10.8)
NEUTROPHILS NFR BLD AUTO: 56.6 %
PDW BLD AUTO: 6.5 FL (ref 7.4–11.4)
PLATELET # BLD: 398 10^3/UL (ref 130–450)
PROT SPEC-MCNC: 5.6 G/DL (ref 6.7–8.2)
RBC MAR: 3.36 10^6/UL (ref 4.7–6.1)
SODIUM SERPLBLD-SCNC: 135 MMOL/L (ref 135–145)

## 2018-08-28 PROCEDURE — 36415 COLL VENOUS BLD VENIPUNCTURE: CPT

## 2018-08-28 PROCEDURE — 93005 ELECTROCARDIOGRAM TRACING: CPT

## 2018-08-28 PROCEDURE — 86920 COMPATIBILITY TEST SPIN: CPT

## 2018-08-28 PROCEDURE — 99283 EMERGENCY DEPT VISIT LOW MDM: CPT

## 2018-08-28 PROCEDURE — 83690 ASSAY OF LIPASE: CPT

## 2018-08-28 PROCEDURE — 30233N1 TRANSFUSION OF NONAUTOLOGOUS RED BLOOD CELLS INTO PERIPHERAL VEIN, PERCUTANEOUS APPROACH: ICD-10-PCS | Performed by: INTERNAL MEDICINE

## 2018-08-28 PROCEDURE — 86900 BLOOD TYPING SEROLOGIC ABO: CPT

## 2018-08-28 PROCEDURE — 99284 EMERGENCY DEPT VISIT MOD MDM: CPT

## 2018-08-28 PROCEDURE — 80053 COMPREHEN METABOLIC PANEL: CPT

## 2018-08-28 PROCEDURE — 86850 RBC ANTIBODY SCREEN: CPT

## 2018-08-28 PROCEDURE — 86901 BLOOD TYPING SEROLOGIC RH(D): CPT

## 2018-08-28 PROCEDURE — 85025 COMPLETE CBC W/AUTO DIFF WBC: CPT

## 2018-08-28 RX ADMIN — FAMOTIDINE SCH MLS/HR: 20 INJECTION, SOLUTION INTRAVENOUS at 22:05

## 2018-08-28 RX ADMIN — SODIUM CHLORIDE, PRESERVATIVE FREE SCH: 5 INJECTION INTRAVENOUS at 23:39

## 2018-08-28 NOTE — HISTORY & PHYSICAL EXAMINATION
Chief Complaint





- Chief Complaint


Chief Complaint: rectal bleeding





History of Present Illness





- Admitted From


Admitted From:: ER/Home





- History Obtained From


Records Reviewed: [facility]


History obtained from: patient and [facility]


Exam Limitations: none





- History of Present Illness


HPI Comment/Other: 


The patient is a 22-year-old black male who has problems with autism and ADD.  

He still lives with his family.  Other than the aforementioned diagnoses he 

considered himself healthy until the last few months.  He started having rectal 

bleeding almost on a daily basis, mildly uncomfortable.  He was seen by Dr. iVdes

's office, general surgery, in February in his office after being referred 

there by Dr. Kathy Mcdonough.  Dr. Flood did a colonoscopy on March 5, 2018 and found him to have proctitis and an ascending colitis.  The pathology 

report showed him to have diffuse moderate chronic colitis with focal mild 

activity.  Negative for granulomas and dysplasia.  These were the findings seen 

in both the descending colon and rectum.  The findings were compatible with 

idiopathic inflammatory disease.  Given the anatomic distribution and diffuse 

nature of involvement of the biopsies, chronic ulcerative colitis was favored 

as a diagnosis.  He was placed on mesalamine.  Dose was gradually reduced.  As 

the dose was reduced, he had increasing bloody diarrhea and abdominal pain over 

several weeks.  He was seen in the emergency room June 12 and was found to be 

anemic and dehydrated with a hemoglobin of 10 and tachycardic to 120.  He was 

admitted to the general surgical service, transfused, given IV steroids with 

hydrocortisone 100 mg every 8 hours.  Within 24 hours his bloody diarrhea had 

almost completely resolved, and by 48 hours he was not having any more blood in 

the stool.  He was still having loose stools.  He was placed on a regular diet 

which he tolerated and he was sent home.  He returned to the emergency room 

June 28, and now hemoglobin was 6.6.  He had dropped from 10.  From the 

emergency room he was transferred to [facility].  While at [facility] he had CT of the 

abdomen, EGD, colonoscopy.  He had a large mass that was not malignant due to 

ulcerative colitis.  He was diagnosed with Clostridium difficile which was 

believed to trigger the ulcerative colitis flare.  He was put on vancomycin, 

mesalamine, prednisone.





He was then seen again in the emergency room July 30 because of weakness and 

fatigue.  His PCP sent him back to the emergency room and his hemoglobin was 

7.7.  He was again transferred back to [facility].  Transfused and sent home.





He has  seen by gastroenterology, [facility], on August 23.  He is to 

be started on a new "Mab" but he cannot remember the name of it.  So far 

authorization has not gone through.





He returned to as August 26, again through the emergency room with bright red 

blood per rectum.  His hemoglobin was stable.  He was off of his prednisone.  

And because of the prednisone taper felt like his rectal bleeding had 

increased.  The case was discussed with [facility] gastroenterology, 

, and it was recommended that he restart his steroids and back on 

mesalamine enemas.  Since his visit to the emergency room, prescriptions were 

called in but he did not pick them up till today.  Because he was still having 

rectal bleeding his PCP sent him back to the emergency room again.





He is continued to have almost daily bloody stool.  Except for maybe a couple 

of days in the last month.  He has generalized abdominal pain all the time.  It 

does not matter if he eats or he does not.  He denies fevers, sweats.  In March 

he was 99.6 kg, and today he is 92 kg.





Today his hemoglobin is 7.6.  Gastroenterology would like him transfused.  

Resumed on oral steroids at prednisone 40 mg a day.  Continue his mesalamine.  

And he will be re-seen in their office.








History





- Past Medical History


Cardiovascular: reports: None


Respiratory: reports: None


Neuro: reports: None


Endocrine/Autoimmune: reports: None


GI: reports: Ulcerative colitis


: reports: None


HEENT: reports: None


Psych: reports: ADD/ADHD, Other (Autism)


Musculoskeletal: reports: None


Derm: reports: None


MRSA Hx?: No





- Past Surgical History


General: reports: Colonoscopy, EGD, Other





- Family & Social History


Family History Comment/Other: Dad is in his 50s and has high blood pressure and 

heart disease.  Mom is in her 50s and is completely healthy.  He has 1 brother, 

1 sister.  No one has mental health issues, ulcerative colitis, thyroid, cancer

, cardiovascular disease.  He has no birth children


Living arrangement: At home


Living Situation: With family


Social History Notes: He has a high school education.  He has not yet to be 

able to live independently as of yet.  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 

ago but because of emotional health issues he had to come home.  He really does 

not like living at home.  His plan was to go back to the job core on September 

10.  This delay with his ulcerative colitis and new diagnosis has left him 

really frustrated.  He says he has never smoked, had no history of alcohol 

abuse.  He has never tried cannabis, cocaine, heroin, LSD, or methamphetamines.





- Substance History


Use: Uses substance without health or social issues: NONE


Abuse: Recurrent use of substance despite neg consequences: NONE


Dependence: Experiences withdrawal or developed tolerances: NONE





- POLST


Patient has POLST: No


POLST Status: Full Code





Meds/Allgy





- Home Medications


Home Medications: 


 Ambulatory Orders











 Medication  Instructions  Recorded  Confirmed


 


Clonidine HCl [Catapres] 0.2 mg PO QPM 03/05/18 08/28/18


 


Fluoxetine HCl [Prozac] 20 mg PO DAILY 03/05/18 08/28/18


 


risperiDONE [Risperdal] 1 mg PO QPM 03/05/18 08/28/18


 


Dextroamphetamine/Amphetamine 20 mg PO .DAILY AT 4PM 06/13/18 08/28/18





[Dextroamp-Amphet ER 20 mg Cap]   


 


Dextroamphetamine/Amphetamine 30 mg PO .DAILY AT 10AM 06/13/18 08/28/18





[Dextroamp-Amphet ER 30 mg Cap]   


 


Docusate Sodium 100 mg PO BID 08/28/18 08/28/18


 


Mesalamine [Apriso] 1.5 g PO DAILY 08/28/18 08/28/18














- Allergies


Allergies/Adverse Reactions: 


 Allergies











Allergy/AdvReac Type Severity Reaction Status Date / Time


 


No Known Drug Allergies Allergy   Verified 08/28/18 16:18














Review of Systems





- Constitutional


Constitutional: reports: Fatigue, Malaise, Weakness, Weight loss.  denies: Fever

, Chills, Poor appetite, Diaphoresis, Night sweats





- Eyes


Eyes: denies: Pain, Irritation, Amaurosis, Blurred vision, Vision loss





- Ears, Nose & Throat


Ears, Nose & Throat: denies: Ear pain, Hearing loss, Vertigo, Nasal obstruction

, Nasal congestion





- Cardiovascular


Cariovascular: denies: Irregular heart rate, Palpitations, Chest pain, Edema, 

Lightheadedness, Syncope





- Respiratory


Respiratory: denies: Cough, Sputum production, Wheezing, Orthopnea, SOB at rest

, SOB with exertion





- Gastrointestinal


Gastrointestinal: reports: Abdominal pain, Diarrhea, Rectal bleeding, Bloody 

stools, Other (In spite of his generalized abdominal aching, he still has an 

appetite and likes to eat.).  denies: Abdominal distention, Constipation, 

Change in bowel habits, Black stools, Nausea, Vomiting, Bile emesis, Nick 

blood emesis





- Genitourinary


Genitourinary: denies: Dysuria, Frequency, Urgency, Hematuria, Flank pain, 

Nocturia





- Musculoskeletal


Musculoskeletal: denies: Muscle pain, Back pain, Muscle aches, Stiffness





- Integumentary


Integumentary: denies: Rash, Pruritis, Lesions, Dryness, Pigment changes





- Neurological


Neurological: reports: General weakness.  denies: Focal weakness, Headache, 

Dizziness, Memory problems, Pre-existing deficit, Abnormal gait





- Psychiatric


Psychiatric: reports: Anxiety.  denies: Depression, Suicidal, Delusions, 

Hallucinations





- Endocrine


Endocrine: denies: Polyuria, Polydypsia, Polyphagia, Intolerance to cold, 

Intolerance to heat





- Hematologic/Lymphatic


Hematologic/Lymphatic: reports: Anemia.  denies: Bruising, Petechiae





Exam





- Vital Signs


Reviewed Vital Signs: Yes


Vital Signs: 





 Vital Signs x48h











  Temp Pulse Pulse Resp BP BP Pulse Ox


 


 08/28/18 20:08  37.1 C  96   17  108/65  


 


 08/28/18 19:51  37.4 C  98   16  116/70  


 


 08/28/18 18:50  37.2 C   98  20   122/70  99














- Physical Exam


General Appearance: positive: No acute distress, Alert, Other (Young black male

, laying comfortably on his left side, asleep but awakens easily when I walk 

into the room.  There is no family at the bedside.)


Eyes Bilateral: positive: PERRL, EOMI


ENT: positive: Pharynx nml


Neck: positive: No JVD.  negative: Stiff neck, Carotid bruit


Respiratory: positive: Chest non-tender.  negative: Wheezes, Rales, Rhonchi


Cardiovascular: positive: Regular rate & rhythm, No murmur.  negative: Gallop/S4

, Friction rub


Peripheral Pulses: positive: 2+


Abdomen: positive: Nml bowel sounds, Tenderness (Generalized and diffuse, mild.)

.  negative: Guarding, Rebound


Skin: positive: Warm, Dry, Pallor


Extremities: positive: Non-tender, Full ROM, Nml appearance


Neurologic/Psychiatric: positive: Oriented x3, CN's nml (2-12), Motor nml





Conclusion/Plan





- Problem List


(1) Chronic blood loss anemia


Conclusion/Plan: 


From his bloody diarrhea that appears to be chronic and almost daily.  That in 

and of itself is from ulcerative colitis.  He has had a colonoscopy here, a 

colonoscopy in [facility] and also has had an EGD.  At this time I will not be 

consulting general surgery.





Plan:


Admit for transfusion


Check hemoglobin and hematocrit daily


At request of gastroenterology, aim for greater than 10 g of hemoglobin








(2) Lower GI bleed


Conclusion/Plan: 


From ulcerative colitis.  Please see #3








(3) Ulcerative colitis


Conclusion/Plan: 


Resume prednisone 40 mg p.o. daily.  Resume mesalamine.  Follow-up with 

outpatient gastroenterology to then start new immunotherapy depending on 

insurance authorization.The patient asked if he could please have a general 

surgical consult because he would like to ask the surgeon if he could just have 

surgery to "cut out the bad part".  I explained to him the nature of ulcerative 

colitis.  I explained to him that surgical remedy is usually not recommended.  

I did explain to him the chronic nature of the disease, the need for compliance

, and that with good control by medication, he should have a relatively normal 

life.


Qualifiers: 


   Ulcerative colitis location: other ulcerative colitis   Digestive disease 

complication type: with rectal bleeding   Qualified Code(s): K51.811 - Other 

ulcerative colitis with rectal bleeding   





(4) ADD (attention deficit disorder)


Conclusion/Plan: 


resume his usual meds. 








- Lab Results


Fish Bones: 


 08/28/18 16:25





 08/28/18 16:25





Core Measures





- Anticipated LOS


I expect patient to be DC'd or transferred within 96 hours.: Yes





- DVT/VTE - Prophylaxis


VTE/DVT Device ordered at admit?: No


Not Ordered - Low Risk: Very low risk


VTE/DVT Prophylaxis med ordered at admit?: No


Not Ordered - Medical Reason: Contraindicated (Chronic GI bleed) Patient was walking out of the room asking for a lighter. This was the 3rd time she has done this. telesitter obtained. 1630: patient repeatedly getting out of bed. She stated that she was going to go outside to have a cigarette. I made her aware that she could not go outside. She repeatedly asked why. I advised her that I would have to call security if she didn't get back in the bed. Nursing supervisor, Patrick Loyd, was called to speak to patient. Patient returned to bed after calling NS.    1830: patient states that she has a headache. Tylenol given.

## 2018-08-28 NOTE — ED PHYSICIAN DOCUMENTATION
History of Present Illness





- Stated complaint


Stated Complaint: ABNORMAL BLOOD TEST/INCREASED HR





- Chief complaint


Chief Complaint: General





- History obtained from


History obtained from: Patient, Family





- History of Present Illness


Timing: Chronic


Pain level max: 0


Pain level now: 0


Improved by: nothing


Worsened by: nothing





- Additonal information


Additional information: 


Patient with chronic ulcerative colitis. Seen here a few days ago for rectal 

bleeding. did not fill his rx and has continue to have rectal bleeding. PCP 

sent him in for eval today because of continued bleeding. 





Review of Systems


Constitutional: denies: Fever, Chills


Skin: denies: Rash


Musculoskeletal: denies: Neck pain, Back pain


Neurologic: denies: Focal weakness, Numbness, Headache





PD PAST MEDICAL HISTORY





- Past Medical History


Past Medical History: Yes


Cardiovascular: None


Respiratory: None


Endocrine/Autoimmune: None


GI: Ulcerative colitis


Psych: ADD/ADHD, Other


Derm: None





- Past Surgical History


Past Surgical History: Yes


General: Other





- Present Medications


Home Medications: 


 Ambulatory Orders











 Medication  Instructions  Recorded  Confirmed


 


Clonidine HCl [Catapres] 0.2 mg PO QPM 03/05/18 08/28/18


 


Fluoxetine HCl [Prozac] 20 mg PO DAILY 03/05/18 08/28/18


 


risperiDONE [Risperdal] 1 mg PO QPM 03/05/18 08/28/18


 


Dextroamphetamine/Amphetamine 20 mg PO .DAILY AT 4PM 06/13/18 08/28/18





[Dextroamp-Amphet ER 20 mg Cap]   


 


Dextroamphetamine/Amphetamine 30 mg PO .DAILY AT 10AM 06/13/18 08/28/18





[Dextroamp-Amphet ER 30 mg Cap]   


 


Docusate Sodium 100 mg PO BID 08/28/18 08/28/18


 


Mesalamine [Apriso] 1.5 g PO DAILY 08/28/18 08/28/18














- Allergies


Allergies/Adverse Reactions: 


 Allergies











Allergy/AdvReac Type Severity Reaction Status Date / Time


 


No Known Drug Allergies Allergy   Verified 08/28/18 16:18














- Social History


Does the pt smoke?: No


Smoking Status: Never smoker


Does the pt drink ETOH?: No


Does the pt have substance abuse?: No





- Immunizations


Immunizations are current?: Yes





- POLST


Patient has POLST: No





PD ED PE NORMAL





- Vitals


Vital signs reviewed: Yes





- General


General: Alert and oriented X 3, No acute distress





- HEENT


HEENT: Moist mucous membranes, Other (pale appearing)





- Neck


Neck: Supple, no meningeal sign





- Cardiac


Cardiac: RRR





- Respiratory


Respiratory: No respiratory distress, Clear bilaterally





- Abdomen


Abdomen: Soft, Non tender, Non distended





- Derm


Derm: Warm and dry





- Neuro


Neuro: Alert and oriented X 3





- Psych


Psych: Normal mood, Normal affect





Results





- Vitals


Vitals: 


 Vital Signs - 24 hr











  08/28/18





  16:14


 


Temperature 37.2 C


 


Heart Rate 116 H


 


Respiratory 16





Rate 


 


Blood Pressure 119/74


 


O2 Saturation 99








 Oxygen











O2 Source                      Room air

















- Labs


Labs: 


 Laboratory Tests











  08/28/18 08/28/18 08/28/18





  16:25 16:25 17:44


 


WBC  7.8  


 


RBC  3.36 L  


 


Hgb  7.6 L  


 


Hct  23.6 L  


 


MCV  70.1 L  


 


MCH  22.5 L  


 


MCHC  32.2  


 


RDW  23.2 H  


 


Plt Count  398  


 


MPV  6.5 L  


 


Neut # (Auto)  4.4  


 


Lymph # (Auto)  1.2 L  


 


Mono # (Auto)  1.8 H  


 


Eos # (Auto)  0.3  


 


Baso # (Auto)  0.0  


 


Absolute Nucleated RBC  0.00  


 


Nucleated RBC %  0.0  


 


Sodium   135 


 


Potassium   3.8 


 


Chloride   101 


 


Carbon Dioxide   29 


 


Anion Gap   5.0 L 


 


BUN   11 


 


Creatinine   1.0 


 


Estimated GFR (MDRD)   93 


 


Glucose   91 


 


Calcium   8.2 L 


 


Total Bilirubin   0.6 


 


AST   14 


 


ALT   13 


 


Alkaline Phosphatase   41 L 


 


Total Protein   5.6 L 


 


Albumin   2.7 L 


 


Globulin   2.9 


 


Albumin/Globulin Ratio   0.9 L 


 


Lipase   25 


 


Blood Type    O POSITIVE


 


Antibody Screen    NEGATIVE


 


Crossmatch IS Only    See Detail














PD MEDICAL DECISION MAKING





- ED course


Complexity details: reviewed old records, reviewed results, re-evaluated patient

, considered differential, d/w patient, d/w family, d/w consultant


ED course: 





Patient is a 22-year-old male who presents to the emergency department with 

continued GI bleed from ulcerative colitis.  Hemoglobin has not dropped below 8 

and he is feeling tired and weak.  Discussed the case with , on-call 

for gastroenterology at [facility] and she recommends placing her in 

observation for transfusion of packed red blood cells and then starting her on 

prednisone 40 mg p.o. daily until he can be seen by GI as an outpatient.  

Discussed the case with Dr. Raymundo, hospitalist who accepts.





This document was made in part using voice recognition software. While efforts 

are made to proofread this document, sound alike and grammatical errors may 

occur.





- Sepsis Event


Vital Signs: 


 Vital Signs - 24 hr











  08/28/18





  16:14


 


Temperature 37.2 C


 


Heart Rate 116 H


 


Respiratory 16





Rate 


 


Blood Pressure 119/74


 


O2 Saturation 99








 Oxygen











O2 Source                      Room air

















Departure





- Departure


Disposition: [facility] DC/Xfer


Clinical Impression: 


 Symptomatic anemia, Lower GI bleed





Condition: Stable


Discharge Date/Time: 08/28/18 18:36

## 2018-08-29 VITALS — DIASTOLIC BLOOD PRESSURE: 74 MMHG | SYSTOLIC BLOOD PRESSURE: 119 MMHG

## 2018-08-29 LAB
BASOPHILS NFR BLD AUTO: 0 10^3/UL (ref 0–0.1)
BASOPHILS NFR BLD AUTO: 0.4 %
EOSINOPHIL # BLD AUTO: 0 10^3/UL (ref 0–0.7)
EOSINOPHIL NFR BLD AUTO: 0 %
ERYTHROCYTE [DISTWIDTH] IN BLOOD BY AUTOMATED COUNT: 23.9 % (ref 12–15)
HGB UR QL STRIP: 8.3 G/DL (ref 14–18)
LYMPHOCYTES # SPEC AUTO: 0.7 10^3/UL (ref 1.5–3.5)
LYMPHOCYTES NFR BLD AUTO: 9.2 %
MCH RBC QN AUTO: 24.1 PG (ref 27–31)
MCHC RBC AUTO-ENTMCNC: 32.6 G/DL (ref 32–36)
MCV RBC AUTO: 73.8 FL (ref 80–94)
MONOCYTES # BLD AUTO: 1.1 10^3/UL (ref 0–1)
MONOCYTES NFR BLD AUTO: 14 %
NEUTROPHILS # BLD AUTO: 6.1 10^3/UL (ref 1.5–6.6)
NEUTROPHILS # SNV AUTO: 8 X10^3/UL (ref 4.8–10.8)
NEUTROPHILS NFR BLD AUTO: 76.4 %
PDW BLD AUTO: 6.9 FL (ref 7.4–11.4)
PLATELET # BLD: 320 10^3/UL (ref 130–450)
RBC MAR: 3.46 10^6/UL (ref 4.7–6.1)

## 2018-08-29 RX ADMIN — FAMOTIDINE SCH MLS/HR: 20 INJECTION, SOLUTION INTRAVENOUS at 09:22

## 2018-08-29 RX ADMIN — SODIUM CHLORIDE, PRESERVATIVE FREE SCH: 5 INJECTION INTRAVENOUS at 09:22

## 2018-08-29 NOTE — DISCHARGE PLAN
Discharge Plan


Disposition: 01 Home, Self Care


Condition: Stable


Prescriptions: 


predniSONE [Deltasone] 40 mg PO DAILY #30 tablet


Diet: Soft


Activity Restrictions: Activity as Tolerated


Shower Restrictions: No


Additional Instructions or Follow Up instructions: 





Start taking Prednisone 40 mg daily. A new prescription was ordered for you, 

sent to [facility] in [place].


Continue usine the Mesalamine.


Continue all your other medications that you were taking pre-hospitalization.





See Dr. Oliver, at [facility], at HIS NEXT AVAILABLE 

OPENING. You can tell them that Dr Kam was contacted by the Hospitalist 

at [facility] and Dr Kam said to fit him in the next 

available opening.





See your PCP if needed for refills before that.





If you have worsening symptoms, come back to the ER.   





No Smoking: If you smoke, Please STOP!  Call [phone] for help.


Follow-up with: 


Kathy Roberts ARNP [Primary Care Provider] -

## 2018-09-03 NOTE — DISCHARGE SUMMARY
Physician: Leah Raymundo MD

DATE OF ADMISSION: 08/28/2018

DATE OF DISCHARGE:  08/29/2018

 

HISTORY OF PRESENT ILLNESS:  This is a 22-year-old, black male who has a 
history of autism and ADD.  He has had a recent diagnosis of ulcerative colitis 
this year after many months of trouble with abdominal pain and bloody diarrhea.
  Most recently, he was on prednisone and mesalamine.  These were weaned to 
off.  He then developed recurrent symptoms over this past one month.  He 
presented to our emergency room 2 days before this admission with an ulcerative 
colitis flare and admitted that he was on no medications.  His hemoglobin was 
stable, and he was advised to restart medications, and was discharged home.  He 
had not yet restarted medications and returned 2 days later with continued 
bright red blood per rectum with every bowel movement, weakness, and abdominal 
pain.  The emergency room doctor spoke to [facility], 
, and it was recommended that he restart his steroids and mesalamine 
enemas, and that he have transfusion for marked anemia.

 

HOSPITAL COURSE AND DISCHARGE DIAGNOSES

1.  Ulcerative colitis with rectal bleeding.  The patient's hemoglobin was 7.6.
  He was transfused 2 units of packed red cells.  A repeat hemoglobin was 8.3.  
He was started back on prednisone 40 mg daily and mesalamine enemas.  He 
required minimal medications for abdominal pain.  He was checked for orthostasis
, but his postural blood pressure was stable, and he was discharged the very 
next day.  He was able to tolerate a diet as well.

2.  Autism.  The patient was continued on his medications.

3.  Attention deficit disorder.  The patient was continued on his medications.

 

ALLERGIES:  NONE.

 

MEDICATIONS AT DISCHARGE

1.  Mesalamine 1.5 grams daily.

2.  Prozac 20 mg daily.

3.  Dextroamphetamine/amphetamine 30 mg daily and 20 mg in the afternoon.

4.  Risperdal 1 mg in the evening.

5.  Clonidine 0.2 mg in the evening.

6.  Prednisone 40 mg daily.

 

PHYSICAL EXAMINATION AT DISCHARGE

VITAL SIGNS:  Stable.  Blood pressure 122/76, without orthostasis.  Heart rate 
88, in sinus rhythm.  Afebrile.  Room air saturation 98%.

HEENT:  Unremarkable.

NECK:  Without JVD or carotid bruits.

CHEST:  Clear.

HEART:  Sounds normal.

ABDOMEN:  Soft, nontender.  Hyperactive bowel sounds.

EXTREMITIES:  Unremarkable.

NEUROLOGIC:  Intact.

 

LABORATORIES AND IMAGING:  Reviewed and summarized above.

 

FOLLOWUP:  The patient was advised to see his gastroenterologist, Dr. Oliver 
at [facility], after I spoke to Dr Oliver, at his next 
available opening.  He is to see his PCP for other refills if needed before 
that time.

 

CODE STATUS:  FULL CODE.

 

Time required to complete this entire dictation, chart review, discharge, 
medication orders, patient education:  30 minutes.

 

 

cc: MARLENA Mcdowell

DD: 09/02/2018 21:02

TD: 09/02/2018 21:10

Job #: [id number]

MTDD

## 2018-09-06 ENCOUNTER — HOSPITAL ENCOUNTER (OUTPATIENT)
Dept: HOSPITAL 76 - LAB | Age: 23
Discharge: HOME | End: 2018-09-06
Attending: INTERNAL MEDICINE
Payer: MEDICAID

## 2018-09-06 DIAGNOSIS — K51.90: ICD-10-CM

## 2018-09-06 DIAGNOSIS — D64.9: Primary | ICD-10-CM

## 2018-09-06 LAB
BASOPHILS NFR BLD AUTO: 0 10^3/UL (ref 0–0.1)
BASOPHILS NFR BLD AUTO: 0.3 %
EOSINOPHIL # BLD AUTO: 0 10^3/UL (ref 0–0.7)
EOSINOPHIL NFR BLD AUTO: 0.2 %
ERYTHROCYTE [DISTWIDTH] IN BLOOD BY AUTOMATED COUNT: 24.8 % (ref 12–15)
HGB UR QL STRIP: 8.8 G/DL (ref 14–18)
LYMPHOCYTES # SPEC AUTO: 0.6 10^3/UL (ref 1.5–3.5)
LYMPHOCYTES NFR BLD AUTO: 4.9 %
MCH RBC QN AUTO: 23.1 PG (ref 27–31)
MCHC RBC AUTO-ENTMCNC: 32.4 G/DL (ref 32–36)
MCV RBC AUTO: 71.2 FL (ref 80–94)
MONOCYTES # BLD AUTO: 0.7 10^3/UL (ref 0–1)
MONOCYTES NFR BLD AUTO: 6 %
NEUTROPHILS # BLD AUTO: 10 10^3/UL (ref 1.5–6.6)
NEUTROPHILS # SNV AUTO: 11.3 X10^3/UL (ref 4.8–10.8)
NEUTROPHILS NFR BLD AUTO: 88.6 %
PDW BLD AUTO: 6.9 FL (ref 7.4–11.4)
PLATELET # BLD: 382 10^3/UL (ref 130–450)
RBC MAR: 3.8 10^6/UL (ref 4.7–6.1)

## 2018-09-06 PROCEDURE — 36415 COLL VENOUS BLD VENIPUNCTURE: CPT

## 2018-09-06 PROCEDURE — 85025 COMPLETE CBC W/AUTO DIFF WBC: CPT

## 2018-09-06 PROCEDURE — 81599 UNLISTED MAAA: CPT

## 2018-09-06 PROCEDURE — 86480 TB TEST CELL IMMUN MEASURE: CPT

## 2018-09-06 PROCEDURE — 86708 HEPATITIS A ANTIBODY: CPT

## 2018-09-06 PROCEDURE — 86317 IMMUNOASSAY INFECTIOUS AGENT: CPT

## 2018-09-06 PROCEDURE — 87340 HEPATITIS B SURFACE AG IA: CPT

## 2018-09-06 PROCEDURE — 86704 HEP B CORE ANTIBODY TOTAL: CPT

## 2018-09-07 LAB
HAV AB SER QL IA: REACTIVE
HEPATITIS B CORE AB TOTAL: (no result)
HEPATITIS B SURFACE ANTIGEN: (no result)

## 2018-09-11 ENCOUNTER — HOSPITAL ENCOUNTER (OUTPATIENT)
Dept: HOSPITAL 76 - LAB.R | Age: 23
Discharge: HOME | End: 2018-09-11
Attending: NURSE PRACTITIONER
Payer: MEDICAID

## 2018-09-11 DIAGNOSIS — K51.911: Primary | ICD-10-CM

## 2018-09-11 DIAGNOSIS — A04.72: ICD-10-CM

## 2018-09-11 DIAGNOSIS — K62.5: ICD-10-CM

## 2018-09-11 PROCEDURE — 87493 C DIFF AMPLIFIED PROBE: CPT

## 2018-09-28 ENCOUNTER — HOSPITAL ENCOUNTER (OUTPATIENT)
Dept: HOSPITAL 76 - LAB.R | Age: 23
Discharge: HOME | End: 2018-09-28
Attending: INTERNAL MEDICINE
Payer: MEDICAID

## 2018-09-28 DIAGNOSIS — D64.9: ICD-10-CM

## 2018-09-28 DIAGNOSIS — K92.1: Primary | ICD-10-CM

## 2018-09-28 DIAGNOSIS — K51.90: ICD-10-CM

## 2018-09-28 PROCEDURE — 81599 UNLISTED MAAA: CPT

## 2018-09-28 PROCEDURE — 87230 TOXIN/ANTITOXIN ASY TISS CUL: CPT

## 2018-10-06 ENCOUNTER — HOSPITAL ENCOUNTER (OUTPATIENT)
Dept: HOSPITAL 76 - ED | Age: 23
Setting detail: OBSERVATION
LOS: 1 days | Discharge: HOME | End: 2018-10-07
Attending: SPECIALIST | Admitting: SPECIALIST
Payer: MEDICAID

## 2018-10-06 DIAGNOSIS — Z86.19: ICD-10-CM

## 2018-10-06 DIAGNOSIS — Z91.138: ICD-10-CM

## 2018-10-06 DIAGNOSIS — T47.8X6A: ICD-10-CM

## 2018-10-06 DIAGNOSIS — N17.9: ICD-10-CM

## 2018-10-06 DIAGNOSIS — Z79.899: ICD-10-CM

## 2018-10-06 DIAGNOSIS — F98.8: ICD-10-CM

## 2018-10-06 DIAGNOSIS — F84.0: ICD-10-CM

## 2018-10-06 DIAGNOSIS — K51.011: ICD-10-CM

## 2018-10-06 DIAGNOSIS — T38.0X6A: ICD-10-CM

## 2018-10-06 DIAGNOSIS — D50.0: Primary | ICD-10-CM

## 2018-10-06 LAB
ALBUMIN DIAFP-MCNC: 2.1 G/DL (ref 3.2–5.5)
ALBUMIN/GLOB SERPL: 0.7 {RATIO} (ref 1–2.2)
ALP SERPL-CCNC: 49 IU/L (ref 42–121)
ALT SERPL W P-5'-P-CCNC: 11 IU/L (ref 10–60)
ANION GAP SERPL CALCULATED.4IONS-SCNC: 6 MMOL/L (ref 6–13)
AST SERPL W P-5'-P-CCNC: 13 IU/L (ref 10–42)
BASOPHILS # BLD MANUAL: 0 10^3/UL (ref 0–0.1)
BASOPHILS NFR BLD AUTO: 0.4 %
BILIRUB BLD-MCNC: 0.5 MG/DL (ref 0.2–1)
BUN SERPL-MCNC: 12 MG/DL (ref 6–20)
CALCIUM UR-MCNC: 7.9 MG/DL (ref 8.5–10.3)
CHLORIDE SERPL-SCNC: 99 MMOL/L (ref 101–111)
CO2 SERPL-SCNC: 28 MMOL/L (ref 21–32)
CREAT SERPLBLD-SCNC: 1.3 MG/DL (ref 0.6–1.2)
EOSINOPHIL # BLD MANUAL: 0 10^3/UL (ref 0–0.7)
EOSINOPHIL NFR BLD AUTO: 0.3 %
ERYTHROCYTE [DISTWIDTH] IN BLOOD BY AUTOMATED COUNT: 22.5 % (ref 12–15)
GFRSERPLBLD MDRD-ARVRAT: 68 ML/MIN/{1.73_M2} (ref 89–?)
GLOBULIN SER-MCNC: 2.9 G/DL (ref 2.1–4.2)
GLUCOSE SERPL-MCNC: 113 MG/DL (ref 70–100)
HGB UR QL STRIP: 5.8 G/DL (ref 14–18)
INR PPP: 1.1 (ref 0.8–1.2)
LIPASE SERPL-CCNC: 21 U/L (ref 22–51)
LYMPH ABN NFR BLD MANUAL: 0 %
LYMPHOBLASTS # BLD: 15 %
LYMPHOCYTES # BLD MANUAL: 1.8 10^3/UL (ref 1.5–3.5)
LYMPHOCYTES NFR BLD AUTO: 8.1 %
MANUAL DIF COMMENT BLD-IMP: (no result)
MCH RBC QN AUTO: 19.9 PG (ref 27–31)
MCHC RBC AUTO-ENTMCNC: 31.1 G/DL (ref 32–36)
MCV RBC AUTO: 64 FL (ref 80–94)
METAMYELOCYTES NFR BLD: 3 %
MONOCYTES # BLD MANUAL: 0.1 10^3/UL (ref 0–1)
MONOCYTES NFR BLD AUTO: 15.8 %
MYELOCYTES NFR BLD: 1 %
NEUTROPHILS # SNV AUTO: 11.8 X10^3/UL (ref 4.8–10.8)
NEUTROPHILS NFR BLD AUTO: 75.4 %
NEUTROPHILS NFR BLD MANUAL: 9.4 10^3/UL (ref 1.5–6.6)
NEUTS BAND NFR BLD MANUAL: 69 %
NEUTS BAND NFR BLD: 11 %
PDW BLD AUTO: 6.9 FL (ref 7.4–11.4)
PLATELET # BLD: 419 10^3/UL (ref 130–450)
PLATELET BLD QL SMEAR: (no result)
PROT SPEC-MCNC: 5 G/DL (ref 6.7–8.2)
PROTHROM ACT/NOR PPP: 12.2 SECS (ref 9.9–12.6)
RBC MAR: 2.9 10^6/UL (ref 4.7–6.1)
RBC MORPH BLD: (no result)
SODIUM SERPLBLD-SCNC: 133 MMOL/L (ref 135–145)

## 2018-10-06 PROCEDURE — 83690 ASSAY OF LIPASE: CPT

## 2018-10-06 PROCEDURE — 86901 BLOOD TYPING SEROLOGIC RH(D): CPT

## 2018-10-06 PROCEDURE — 86850 RBC ANTIBODY SCREEN: CPT

## 2018-10-06 PROCEDURE — 36415 COLL VENOUS BLD VENIPUNCTURE: CPT

## 2018-10-06 PROCEDURE — 36430 TRANSFUSION BLD/BLD COMPNT: CPT

## 2018-10-06 PROCEDURE — 96374 THER/PROPH/DIAG INJ IV PUSH: CPT

## 2018-10-06 PROCEDURE — 85014 HEMATOCRIT: CPT

## 2018-10-06 PROCEDURE — 80053 COMPREHEN METABOLIC PANEL: CPT

## 2018-10-06 PROCEDURE — 85610 PROTHROMBIN TIME: CPT

## 2018-10-06 PROCEDURE — 86920 COMPATIBILITY TEST SPIN: CPT

## 2018-10-06 PROCEDURE — 85730 THROMBOPLASTIN TIME PARTIAL: CPT

## 2018-10-06 PROCEDURE — 86900 BLOOD TYPING SEROLOGIC ABO: CPT

## 2018-10-06 PROCEDURE — 85018 HEMOGLOBIN: CPT

## 2018-10-06 PROCEDURE — 99283 EMERGENCY DEPT VISIT LOW MDM: CPT

## 2018-10-06 PROCEDURE — 85025 COMPLETE CBC W/AUTO DIFF WBC: CPT

## 2018-10-06 PROCEDURE — 99284 EMERGENCY DEPT VISIT MOD MDM: CPT

## 2018-10-06 RX ADMIN — SODIUM CHLORIDE, PRESERVATIVE FREE SCH ML: 5 INJECTION INTRAVENOUS at 22:19

## 2018-10-06 RX ADMIN — SODIUM CHLORIDE, PRESERVATIVE FREE SCH: 5 INJECTION INTRAVENOUS at 15:55

## 2018-10-06 NOTE — ED PHYSICIAN DOCUMENTATION
PD HPI GI BLEED





- Stated complaint


Stated Complaint: RECTAL BLEEDING/WEAKNESS





- Chief complaint


Chief Complaint: General





- History obtained from


History obtained from: Patient





- History of Present Illness


Timing - onset: How many weeks ago (1)


Timing - duration: Weeks (1)


Timing - details: Gradual onset, Still present


Associated symptoms: BRBPR


Contributing factors: Other (UC)


Improved by: Meds


Similar symptoms before: Diagnosis (UC and C. Diff requriring transfusion)


Recently seen: Not recently seen





- Additional information


Additional information: 





23-year-old male with autism and ADD has a history of ulcerative colitis that 

started about 1 year ago with intermittent rectal bleeding.  He subsequently saw

Dr. Pepper was diagnosed with ulcerative colitis and started on mesalamine and 

steroids.  He improved in June and then developed continued bleeding and 

required transfusion and transfer.  He had a long hospitalization at [facility] and 

following this he has had a transfusion done here for symptomatic anemia and he 

has had another transfusion done at [facility] for symptomatic anemia and 

exacerbation of his disease.  He states that he has run out of his prednisone 

and his symptoms have returned with bleeding that he has every day in the 

morning.  He is now developed lightheadedness and dizziness again.  He has an 

appointment to see his gastroenterologist next week. He has been on pentaza, 

meslamine and prednisone with some success. 





Review of Systems


Constitutional: reports: Fatigue.  denies: Fever, Chills


Eyes: denies: Decreased vision


Ears: denies: Ear pain


Nose: denies: Rhinorrhea / runny nose, Congestion


Throat: denies: Sore throat


Cardiac: denies: Chest pain / pressure, Palpitations


Respiratory: reports: Dyspnea.  denies: Cough


GI: reports: Diarrhea, Bloody / black stool.  denies: Abdominal Pain, Nausea, 

Vomiting


: denies: Dysuria, Frequency


Skin: denies: Rash


Musculoskeletal: denies: Neck pain, Back pain, Extremity pain


Neurologic: reports: Generalized weakness.  denies: Focal weakness, Numbness





PD PAST MEDICAL HISTORY





- Past Medical History


Cardiovascular: None


Respiratory: None


Neuro: None


Endocrine/Autoimmune: None


GI: Ulcerative colitis


: None


HEENT: None


Psych: ADD/ADHD, Other


Musculoskeletal: None


Derm: None





- Past Surgical History


Past Surgical History: Yes


General: Other





- Present Medications


Home Medications: 


                                Ambulatory Orders











 Medication  Instructions  Recorded  Confirmed


 


Clonidine HCl [Catapres] 0.2 mg PO QPM 03/05/18 08/28/18


 


Fluoxetine HCl [Prozac] 20 mg PO DAILY 03/05/18 08/28/18


 


risperiDONE [Risperdal] 1 mg PO QPM 03/05/18 08/28/18


 


Dextroamphetamine/Amphetamine 20 mg PO .DAILY AT 4PM 06/13/18 08/28/18





[Dextroamp-Amphet ER 20 mg Cap]   


 


Dextroamphetamine/Amphetamine 30 mg PO .DAILY AT 10AM 06/13/18 08/28/18





[Dextroamp-Amphet ER 30 mg Cap]   


 


predniSONE [Deltasone] 25 mg PO DAILY 10/06/18 














- Allergies


Allergies/Adverse Reactions: 


                                    Allergies











Allergy/AdvReac Type Severity Reaction Status Date / Time


 


No Known Drug Allergies Allergy   Verified 08/28/18 16:18














- Social History


Does the pt smoke?: No


Smoking Status: Never smoker


Does the pt drink ETOH?: No


Does the pt have substance abuse?: No





- Immunizations


Immunizations are current?: Yes





- POLST


Patient has POLST: No


POLST Status: Full Code





PD ED PE NORMAL





- Vitals


Vital signs reviewed: Yes (tachy )





- General


General: No acute distress, Well developed/nourished, Other (pale appearing male

 with some delay in execution of motor commands. )





- HEENT


HEENT: Atraumatic, PERRL, EOMI





- Neck


Neck: Supple, no meningeal sign





- Cardiac


Cardiac: No murmur, Other (tachy to 110)





- Respiratory


Respiratory: No respiratory distress, Clear bilaterally





- Abdomen


Abdomen: Soft, Non tender





- Back


Back: No CVA TTP, No spinal TTP





- Derm


Derm: Normal color, Warm and dry, No rash





- Extremities


Extremities: No deformity, No edema





- Neuro


Neuro: CNs 2-12 intact, No motor deficit, No sensory deficit, Normal speech


Eye Opening: Spontaneous


Motor: Obeys Commands


Verbal: Oriented


GCS Score: 15





- Psych


Psych: Normal mood, Normal affect





Results





- Vitals


Vitals: 


                               Vital Signs - 24 hr











  10/06/18





  13:35


 


Temperature 36.7 C


 


Heart Rate 115 H


 


Respiratory 18





Rate 


 


Blood Pressure 125/79


 


O2 Saturation 100








                                     Oxygen











O2 Source                      Room air

















- Labs


Labs: 


                                Laboratory Tests











  10/06/18 10/06/18





  13:58 13:58


 


WBC  11.8 H 


 


RBC  2.90 L 


 


Hgb  5.8 L* 


 


Hct  18.6 L* 


 


MCV  64.0 L 


 


MCH  19.9 L 


 


MCHC  31.1 L 


 


RDW  22.5 H 


 


Plt Count  419 


 


MPV  6.9 L 


 


PT   12.2


 


INR   1.1


 


APTT   25.9














PD MEDICAL DECISION MAKING





- ED course


Complexity details: reviewed old records, reviewed results, re-evaluated 

patient, considered differential, d/w patient


ED course: 





22 y/o Autistic male with a history of ulcerative colitis has gone off of his 

medications and has a flare of his colitis with extra bleeding and he is now 

symptomatic from anemia with a hemoglobin of 5.8. He will need admission for 

symptomatic anemia and treatment of ulcerative colitis. 





- Sepsis Event


Vital Signs: 


                               Vital Signs - 24 hr











  10/06/18





  13:35


 


Temperature 36.7 C


 


Heart Rate 115 H


 


Respiratory 18





Rate 


 


Blood Pressure 125/79


 


O2 Saturation 100








                                     Oxygen











O2 Source                      Room air

















Departure





- Departure


Disposition: [facility] DC/Xfer


Clinical Impression: 


 Symptomatic anemia, Lower GI bleed, Chronic blood loss anemia





Ulcerative colitis


Qualifiers:


 Ulcerative colitis location: unspecified ulcerative colitis location Digestive 

disease complication type: with rectal bleeding Qualified Code(s): K51.911 - 

Ulcerative colitis, unspecified with rectal bleeding





Condition: Serious

## 2018-10-06 NOTE — HISTORY & PHYSICAL EXAMINATION
Chief Complaint





- Chief Complaint


Chief Complaint: rectal bleeding and fatigue in a pt w IBD





History of Present Illness





- Admitted From


Admitted From:: Home/ER





- History Obtained From


Records Reviewed: [facility]


History obtained from: Dr. Howard


Exam Limitations: none





- History of Present Illness


HPI Comment/Other: 


He is a 23-year-old black male who is well-known to our service.  He has a 

history of ADD and autism and unfortunately also has inflammatory bowel disease.

 When he is on his medications for IBD he does well.  When he stops taking his 

medications he starts having rectal bleeding and symptomatic abdominal pain and 

anemia.  He has been evaluated several times by general surgery and most 

recently had an endoscopy with Dr. Ernst Pepper.





He now returns after not having taken his medicines for a few days.  He began 

having rectal bleeding.  Came to the emergency room with fatigue and rectal 

bleeding and his hemoglobin is 5.6.





He started having rectal bleeding almost on a daily basis, mildly uncomfortable.

 He was seen by Dr. Vides's office, general surgery, in February in his office 

after being referred there by Dr. Kathy Mcdonough.  Dr. Flood did a 

colonoscopy on March 5, 2018 and found him to have proctitis and an ascending 

colitis.  The pathology report showed him to have diffuse moderate chronic 

colitis with focal mild activity.  Negative for granulomas and dysplasia.  These

were the findings seen in both the descending colon and rectum.  The findings 

were compatible with idiopathic inflammatory disease.  Given the anatomic 

distribution and diffuse nature of involvement of the biopsies, chronic 

ulcerative colitis was favored as a diagnosis.  He was placed on mesalamine.  

Dose was gradually reduced.  As the dose was reduced, he had increasing bloody 

diarrhea and abdominal pain over several weeks.  He was seen in the emergency 

room June 12 and was found to be anemic and dehydrated with a hemoglobin of 10 

and tachycardic to 120.  He was admitted to the general surgical service, jason mercedes, given IV steroids with hydrocortisone 100 mg every 8 hours.  Within 

24 hours his bloody diarrhea had almost completely resolved, and by 48 hours he 

was not having any more blood in the stool.  He was still having loose stools.  

He was placed on a regular diet which he tolerated and he was sent home.  He 

returned to the emergency room June 28, and now hemoglobin was 6.6.  He had 

dropped from 10.  From the emergency room he was transferred to [facility].  While 

at [facility] he had CT of the abdomen, EGD, colonoscopy.  He had a large mass that 

was not malignant due to ulcerative colitis.  He was diagnosed with Clostridium 

difficile which was believed to trigger the ulcerative colitis flare.  He was 

put on vancomycin, mesalamine, prednisone.





He was then seen again in the emergency room July 30 because of weakness and 

fatigue.  His PCP sent him back to the emergency room and his hemoglobin was 

7.7.  He was again transferred back to [facility].  Transfused and sent home.





He has  seen by gastroenterology, [facility], on August 23.  He is to be

started on a new "Mab" but he cannot remember the name of it.  





He returned to August 26, again through the emergency room with bright red blood

per rectum.  His hemoglobin was stable.  He was off of his prednisone.  And 

because of the prednisone taper felt like his rectal bleeding had increased.  

The case was discussed with [facility] gastroenterology, , 

and it was recommended that he restart his steroids and back on mesalamine 

enemas.  With his visit to the emergency room, prescriptions were called in but 

he did not pick them up.   Because he was still having rectal bleeding his PCP 

sent him back to the emergency room again. He was admitted again 8/28 for blood 

transfusions. 





With this episode, he denies fevers, sweats, abdominal pain. He stopped taking 

his steroids for a week because of no refills and had to contact his GI MD 

office. He resumed them but by then he was bleeding again and hasn't stopped.  

In March he was 99.6 kg, in August 92 kg and today is 97Kg. As far as he knows 

he is only on prednisone and had managed to taper it down to 30 mg before he ran

out. His GI MD did not refill the mesalamine. He's tired. Fatigued so he came to

the ER and anemia to 5.8 found. He is due to see his GI MD on 10/12/18. 

















History





- Past Medical History


Cardiovascular: reports: None


Respiratory: reports: None


Neuro: reports: None


Endocrine/Autoimmune: reports: None


GI: reports: Ulcerative colitis


: reports: None


HEENT: reports: None


Psych: reports: ADD/ADHD, Other


Musculoskeletal: reports: None


Derm: reports: None


MRSA Hx?: No





- Past Surgical History


General: reports: Colonoscopy, EGD, Other





- Family & Social History


Family History Comment/Other: Dad is in his 50s and has high blood pressure and 

heart disease.  Mom is in her 50s and is completely healthy.  He has 1 brother, 

1 sister.  No one has mental health issues, ulcerative colitis, thyroid, cancer,

cardiovascular disease.  He has no birth children


Social History Notes: He has a high school education.  He has not yet to be able

to live independently as of yet.  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

because of emotional health issues he had to come home.  He really does not like

living at home.  His plan was to go back to the job core on September 10.  This 

delay with his ulcerative colitis and new diagnosis has left him really 

frustrated.  He says he has never smoked, had no history of alcohol abuse.  He 

has never tried cannabis, cocaine, heroin, LSD, or methamphetamines.





- Substance History


Use: Uses substance without health or social issues: NONE


Abuse: Recurrent use of substance despite neg consequences: NONE


Dependence: Experiences withdrawal or developed tolerances: NONE





- POLST


Patient has POLST: No


POLST Status: Full Code





Meds/Allgy





- Home Medications


Home Medications: 


                                Ambulatory Orders











 Medication  Instructions  Recorded  Confirmed


 


Clonidine HCl [Catapres] 0.2 mg PO QPM 03/05/18 10/06/18


 


Fluoxetine HCl [Prozac] 20 mg PO DAILY 03/05/18 10/06/18


 


risperiDONE [Risperdal] 1 mg PO QPM 03/05/18 10/06/18


 


Dextroamphetamine/Amphetamine 15 mg PO .DAILY AT 4PM 06/13/18 10/06/18





[Dextroamp-Amphet ER 20 mg Cap]   


 


Dextroamphetamine/Amphetamine 30 mg PO .DAILY AT 10AM 06/13/18 10/06/18





[Dextroamp-Amphet ER 30 mg Cap]   


 


predniSONE [Deltasone] 25 mg PO DAILY 10/06/18 10/06/18














- Allergies


Allergies/Adverse Reactions: 


                                    Allergies











Allergy/AdvReac Type Severity Reaction Status Date / Time


 


No Known Drug Allergies Allergy   Verified 08/28/18 16:18











Prior Level of Functionality: 


He lives at home with his mother and father.  Is independent with regards to 

activities of daily living.  He can dress himself, feed himself, but is disabled

 with regards to getting gainful employment because of psychiatric disability 

and ulcerative colitis. When he needs to get places he either walks or, takes a 

bicycle, or has people drop them off.  For instance he is getting someone to 

drop him off at his doctor visit on October 12.  He does not use a cane, walker,

 or wheelchair.








Exam





- Vital Signs


Reviewed Vital Signs: Yes


Vital Signs: 





                                Vital Signs x48h











  Temp Pulse Resp BP Pulse Ox


 


 10/06/18 13:35  36.7 C  115 H  18  125/79  100














- Physical Exam


General Appearance: positive: No acute distress, Alert, Other (Monotonic, 

anhedonic, pale black male well-groomed, well-developed)


Eyes Bilateral: positive: PERRL, EOMI


ENT: positive: Pharynx nml


Neck: positive: No JVD.  negative: Stiff neck, Carotid bruit


Respiratory: positive: Chest non-tender.  negative: Wheezes, Rales, Rhonchi


Cardiovascular: positive: Regular rate & rhythm.  negative: Systolic murmur, 

Gallop/S4, Friction rub


Peripheral Pulses: positive: 1+


Abdomen: positive: Non-tender, No organomegaly, Nml bowel sounds, No distention.

  negative: Guarding, Rebound


Skin: positive: Warm, Dry


Extremities: positive: Non-tender, Full ROM, No pedal edema


Neurologic/Psychiatric: positive: Oriented x3, CN's nml (2-12), Motor nml.  

negative: Mood/affect nml (monotonic, anhedonic form of expression.)





Conclusion/Plan





- Problem List


(1) Chronic blood loss anemia


Conclusion/Plan: 


This is from chronic bloody rectal discharge.  That in turn is from 

noncompliance with his medications for ulcerative colitis.





Plan:


+Observation status


+Have social work investigate what his exact living situation is.  Is it 

possible that someone else could be giving him his medications so he can stop 

being readmitted for an inflammatory bowel disease flare.


+Type and cross for 4 units and transfuse


+Resume his ulcerative colitis medications








(2) Ulcerative colitis


Conclusion/Plan: 


He will be resumed on his usual medical agents which he states is only steroids.

  His usual GI MD is Jacob Oliver but he is off and Dr. Duane England is 

on call. He graciously reviewed the med records for me in their office. Last 

seen 9/6 and on steroid taper, pentasa finished. work up ongoing for possible 

remicade or entyvio. Wanted to induce steroid remission with 40 mg prednisone 

and he is due to be seen 10/12 to see if they can start the biologics. He is to 

remain on the steroids. So I will resume 40 mg po daily fo prednisone. 


Qualifiers: 


   Ulcerative colitis location: unspecified ulcerative colitis location   

Digestive disease complication type: with rectal bleeding   Qualified Code(s): 

K51.911 - Ulcerative colitis, unspecified with rectal bleeding   





(3) Noncompliance with medication regimen


Conclusion/Plan: 


I have asked social work to work with him and his family to figure out how we 

can do a work around.  I would like to see him take his medications regularly.








(4) MIKE (acute kidney injury)


Conclusion/Plan: 


for the first time his creatinine has bumped up. He is usually less than 1.0. 


Recheck in am. 











- Lab Results


Fish Bones: 


                                 10/06/18 13:58





                                 10/06/18 13:58





Core Measures





- Anticipated LOS


I expect patient to be DC'd or transferred within 96 hours.: Yes





- DVT/VTE - Prophylaxis


VTE/DVT Device ordered at admit?: Yes

## 2018-10-07 VITALS — SYSTOLIC BLOOD PRESSURE: 117 MMHG | DIASTOLIC BLOOD PRESSURE: 68 MMHG

## 2018-10-07 LAB — HGB UR QL STRIP: 9.3 G/DL (ref 14–18)

## 2018-10-07 RX ADMIN — SODIUM CHLORIDE, PRESERVATIVE FREE SCH: 5 INJECTION INTRAVENOUS at 10:24

## 2018-10-07 RX ADMIN — SODIUM CHLORIDE, PRESERVATIVE FREE SCH ML: 5 INJECTION INTRAVENOUS at 00:17

## 2018-10-07 NOTE — DISCHARGE SUMMARY
Physician: Janee Shafer MD

DATE OF ADMISSION: 10/06/2018

DATE OF DISCHARGE:  10/07/2018

 

DISCHARGE DIAGNOSES 

1.  Chronic blood loss anemia.

2.  Ulcerative colitis.

3.  Noncompliance with medical regimen.

4.  Acute kidney injury.

 

DISCHARGE MEDICATIONS 

1.  Prednisone 40 mg p.o. daily.

2.  Catapres 0.2 mg p.o. every evening.

3.  Dextroamphetamine with amphetamine 20 mg capsule, 1 capsule daily at 4:00  
p.m., and 2 capsules daily at 10:00 a.m.

4.  Prozac 20 mg daily.

5.  Risperdal 1 mg daily.

6.  Prednisone 40 mg p.o. daily.

 

PRINCIPAL PROCEDURE:  Transfusion of 4 units of packed cells.

 

HOSPITAL COURSE:  The patient is a 23-year-old black male with ADD and autism.  
He takes medications for that and is compliant for those medications.  He was 
diagnosed with ulcerative colitis earlier this year after presenting as several 
episodes of bright red blood per rectum.  He is seen by [facility].  There has been a big disconnect with his ability to get
medications, and some of it is due to the patient's noncompliance, but some of 
it is also lack of prior authorization for his medications when he goes to the 
pharmacy.  When we spoke to the pharmacy, the patient does come in on a regular 
basis and does call on a regular basis to see if his medications are there.  
Unfortunately, his GI drugs for his ulcerative colitis are the ones that seemed 
to be missing.  As such, he has not been on any definitive therapy other than 
mesalamine and prednisone this entire time.  I will reach out to both his 
primary care provider office and [facility].  I can 
only stress so much that the patient needs to be on some type of management on a
regular basis.  It appears he is compliant with regard to at least attempting to
 his medicines.  The loop that needs to be closed is the prior 
authorization for his medications.

 

After transfusion of 4 units of packed cells,  patient is discharged in stable 
condition.  He is eating his breakfast.  He has been afebrile, tolerated his 
stay here without any complications.

 

PHYSICAL EXAMINATION

VITAL SIGNS:  At discharge, temperature is 36.6, pulse 105, blood pressure 
117/68, respirations 18, 99% on room air.  

GENERAL:  A young male, looks stated age, well groomed, well nourished.

NECK:  Supple.

LUNGS:  Clear.

HEART:  Regular rate and rhythm.

ABDOMEN:  Soft, nontender, no masses.  Normal bowel sounds.

EXTREMITIES:  Without edema.

 

DISCHARGE INSTRUCTIONS:  [facility] did want him on 
prednisone 40 mg a day.  He will see them in followup on 10/12/2018 to be 
evaluated for Entyvio and Remicade.  Again, I can only stress that prior 
authorization must be in place, so he can  the medications at the 
pharmacy.  He will continue on prednisone until then.  I have also asked him to 
see his primary care provider, Kathy Roberts, in followup the next 2-3 
weeks.

 

 

DD: 10/07/2018 11:06

TD: 10/07/2018 11:12

Job #: [id number]

LILIAN

## 2018-10-07 NOTE — DISCHARGE PLAN
Discharge Plan


Disposition: 01 Home, Self Care


Condition: Good


Prescriptions: 


predniSONE [Deltasone] 40 mg PO DAILY #60 tablet


Diet: Regular


Activity Restrictions: Activity as Tolerated


Shower Restrictions: No


Driving Restrictions: No


Additional Instructions or Follow Up instructions: 


You were admitted into the hospital because of rectal bleeding that had caused 

enough anemia that your hemoglobin was now down to 5.8 g.  Normal for a person 

your age is 13.  We think the cause of the bleeding was not taking your 

medicines for a few days.  You have inflammatory bowel disease and your disease 

is very sensitive to being on medications or not.  The week that you ran out of 

medications and were not able to take them most likely resulted and a surge of 

inflammation and then the bleeding.  You were transfused 4 units of blood.





I did speak to your gastroenterology group.  They do want you to be on 

prednisone 40 mg a day.  They will be seeing you on October 12 to decide if you 

are going to be on Entyvio or Remicade.





We can only stressed to you that you must take your medications.  This is now 

the second time that you come into the hospital because of a flareup of your 

disease secondary to not having your medications.  Recheck out to your family, 

friends, and figure out a system that would allow you to be reminded to take 

your medicines every day.  And if you need refills reach out for help in making 

those prescriptions happen.


No Smoking: If you smoke, Please STOP!  Call [phone] for help.


Follow-up with: 


Kathy Roberts ARNP [Primary Care Provider] -

## 2018-10-23 ENCOUNTER — HOSPITAL ENCOUNTER (EMERGENCY)
Dept: HOSPITAL 76 - ED | Age: 23
LOS: 1 days | Discharge: ANOTHER HOSPITAL | End: 2018-10-24
Payer: MEDICAID

## 2018-10-23 ENCOUNTER — HOSPITAL ENCOUNTER (OUTPATIENT)
Dept: HOSPITAL 76 - EMS | Age: 23
Discharge: ANOTHER HOSPITAL | End: 2018-10-23
Attending: SURGERY
Payer: MEDICAID

## 2018-10-23 DIAGNOSIS — R60.1: ICD-10-CM

## 2018-10-23 DIAGNOSIS — A04.72: Primary | ICD-10-CM

## 2018-10-23 DIAGNOSIS — D64.9: ICD-10-CM

## 2018-10-23 DIAGNOSIS — E86.0: ICD-10-CM

## 2018-10-23 DIAGNOSIS — R60.0: Primary | ICD-10-CM

## 2018-10-23 LAB
ALBUMIN DIAFP-MCNC: 2.3 G/DL (ref 3.2–5.5)
ALBUMIN/GLOB SERPL: 0.9 {RATIO} (ref 1–2.2)
ALP SERPL-CCNC: 37 IU/L (ref 42–121)
ALT SERPL W P-5'-P-CCNC: 20 IU/L (ref 10–60)
ANION GAP SERPL CALCULATED.4IONS-SCNC: 4 MMOL/L (ref 6–13)
AST SERPL W P-5'-P-CCNC: 17 IU/L (ref 10–42)
BASOPHILS NFR BLD AUTO: 0 10^3/UL (ref 0–0.1)
BASOPHILS NFR BLD AUTO: 0.1 %
BILIRUB BLD-MCNC: 0.5 MG/DL (ref 0.2–1)
BUN SERPL-MCNC: 15 MG/DL (ref 6–20)
CALCIUM UR-MCNC: 7.8 MG/DL (ref 8.5–10.3)
CHLORIDE SERPL-SCNC: 102 MMOL/L (ref 101–111)
CO2 SERPL-SCNC: 28 MMOL/L (ref 21–32)
CREAT SERPLBLD-SCNC: 1.1 MG/DL (ref 0.6–1.2)
EOSINOPHIL # BLD AUTO: 0.1 10^3/UL (ref 0–0.7)
EOSINOPHIL NFR BLD AUTO: 1.2 %
ERYTHROCYTE [DISTWIDTH] IN BLOOD BY AUTOMATED COUNT: 29.4 % (ref 12–15)
GFRSERPLBLD MDRD-ARVRAT: 82 ML/MIN/{1.73_M2} (ref 89–?)
GLOBULIN SER-MCNC: 2.7 G/DL (ref 2.1–4.2)
GLUCOSE SERPL-MCNC: 103 MG/DL (ref 70–100)
HGB UR QL STRIP: 7.1 G/DL (ref 14–18)
LIPASE SERPL-CCNC: 31 U/L (ref 22–51)
LYMPHOCYTES # SPEC AUTO: 0.6 10^3/UL (ref 1.5–3.5)
LYMPHOCYTES NFR BLD AUTO: 6.1 %
MCH RBC QN AUTO: 23.2 PG (ref 27–31)
MCHC RBC AUTO-ENTMCNC: 32.1 G/DL (ref 32–36)
MCV RBC AUTO: 72.1 FL (ref 80–94)
MONOCYTES # BLD AUTO: 1.2 10^3/UL (ref 0–1)
MONOCYTES NFR BLD AUTO: 11.7 %
NEUTROPHILS # BLD AUTO: 8.5 10^3/UL (ref 1.5–6.6)
NEUTROPHILS # SNV AUTO: 10.5 X10^3/UL (ref 4.8–10.8)
NEUTROPHILS NFR BLD AUTO: 80.9 %
PDW BLD AUTO: 6.9 FL (ref 7.4–11.4)
PLAT MORPH BLD: (no result)
PLATELET # BLD: 480 10^3/UL (ref 130–450)
PLATELET BLD QL SMEAR: (no result)
PROT SPEC-MCNC: 5 G/DL (ref 6.7–8.2)
RBC MAR: 3.08 10^6/UL (ref 4.7–6.1)
RBC MORPH BLD: (no result)
SODIUM SERPLBLD-SCNC: 134 MMOL/L (ref 135–145)

## 2018-10-23 PROCEDURE — 36415 COLL VENOUS BLD VENIPUNCTURE: CPT

## 2018-10-23 PROCEDURE — 83880 ASSAY OF NATRIURETIC PEPTIDE: CPT

## 2018-10-23 PROCEDURE — 99285 EMERGENCY DEPT VISIT HI MDM: CPT

## 2018-10-23 PROCEDURE — 85018 HEMOGLOBIN: CPT

## 2018-10-23 PROCEDURE — 86900 BLOOD TYPING SEROLOGIC ABO: CPT

## 2018-10-23 PROCEDURE — 83690 ASSAY OF LIPASE: CPT

## 2018-10-23 PROCEDURE — 96374 THER/PROPH/DIAG INJ IV PUSH: CPT

## 2018-10-23 PROCEDURE — 86901 BLOOD TYPING SEROLOGIC RH(D): CPT

## 2018-10-23 PROCEDURE — 86850 RBC ANTIBODY SCREEN: CPT

## 2018-10-23 PROCEDURE — 85025 COMPLETE CBC W/AUTO DIFF WBC: CPT

## 2018-10-23 PROCEDURE — 99284 EMERGENCY DEPT VISIT MOD MDM: CPT

## 2018-10-23 PROCEDURE — 93005 ELECTROCARDIOGRAM TRACING: CPT

## 2018-10-23 PROCEDURE — 80053 COMPREHEN METABOLIC PANEL: CPT

## 2018-10-23 PROCEDURE — 86920 COMPATIBILITY TEST SPIN: CPT

## 2018-10-23 PROCEDURE — 85014 HEMATOCRIT: CPT

## 2018-10-23 PROCEDURE — 96375 TX/PRO/DX INJ NEW DRUG ADDON: CPT

## 2018-10-23 PROCEDURE — 84484 ASSAY OF TROPONIN QUANT: CPT

## 2018-10-23 NOTE — ED PHYSICIAN DOCUMENTATION
History of Present Illness





- Stated complaint


Stated Complaint: LEG SWELLING





- Chief complaint


Chief Complaint: General





- History obtained from


History obtained from: Patient, EMS





- History of Present Illness


Timing: How many days ago (2)





- Additonal information


Additional information: 





23-year-old autistic male with ADHD has recently been hospitalized for exace

rbation of ulcerative colitis which required a 4 unit transfusion.  He has 

subsequently been seen by gastroenterology and treatment has begun on active C. 

difficile infection.  He remains on 40 mg of prednisone per day and when he 

developed increased bleeding recently he increased to a third 20 mg at night.  

Over the past 2 days he has noticed extreme swelling of his entire body and some

shortness of breath on any exertion.  He denies any chest pain.  He denies any 

shortness of breath at rest.





Review of Systems


Constitutional: reports: Fatigue.  denies: Fever, Chills, Myalgias


Eyes: denies: Decreased vision


Ears: denies: Ear pain


Nose: denies: Rhinorrhea / runny nose, Congestion


Throat: denies: Sore throat


Cardiac: denies: Chest pain / pressure, Palpitations


Respiratory: reports: Dyspnea.  denies: Cough


GI: reports: Diarrhea, Bloody / black stool.  denies: Abdominal Pain, Nausea, 

Vomiting


: denies: Dysuria, Frequency


Skin: denies: Rash


Musculoskeletal: reports: Extremity pain, Extremity swelling.  denies: Neck 

pain, Back pain


Neurologic: reports: Generalized weakness.  denies: Focal weakness, Numbness





PD PAST MEDICAL HISTORY





- Past Medical History


Cardiovascular: None


Respiratory: None


Neuro: None


Endocrine/Autoimmune: None


GI: Ulcerative colitis


: None


HEENT: None


Psych: ADD/ADHD, Other


Musculoskeletal: None


Derm: None





- Past Surgical History


Past Surgical History: Yes


General: Colonoscopy, EGD, Other





- Present Medications


Home Medications: 


                                Ambulatory Orders











 Medication  Instructions  Recorded  Confirmed


 


Clonidine HCl [Catapres] 0.2 mg PO QPM 03/05/18 10/06/18


 


Fluoxetine HCl [Prozac] 20 mg PO DAILY 03/05/18 10/06/18


 


risperiDONE [Risperdal] 1 mg PO QPM 03/05/18 10/06/18


 


Dextroamphetamine/Amphetamine 30 mg PO .DAILY AT 10AM 06/13/18 10/06/18





[Dextroamp-Amphet ER 30 mg Cap]   


 


predniSONE [Deltasone] 40 mg PO DAILY #60 tablet 10/07/18 


 


Prednisone 5 mg PO 10/23/18 


 


Ranitidine HCl [Heartburn Relief] 150 mg PO 10/23/18 


 


Vancomycin [Vancocin] 125 mg PO QID 10/23/18 10/23/18














- Allergies


Allergies/Adverse Reactions: 


                                    Allergies











Allergy/AdvReac Type Severity Reaction Status Date / Time


 


No Known Drug Allergies Allergy   Verified 08/28/18 16:18














- Social History


Does the pt smoke?: No


Smoking Status: Never smoker


Does the pt drink ETOH?: No


Does the pt have substance abuse?: No





- Immunizations


Immunizations are current?: Yes





- POLST


Patient has POLST: No


POLST Status: Full Code





PD ED PE NORMAL





- Vitals


Vital signs reviewed: Yes (hypertensive )





- General


General: Alert and oriented X 3, Well developed/nourished, Other (mild tachypnea

 at rest with talking )





- HEENT


HEENT: Atraumatic, PERRL





- Neck


Neck: Supple, no meningeal sign





- Cardiac


Cardiac: RRR, No murmur





- Respiratory


Respiratory: No respiratory distress, Clear bilaterally





- Abdomen


Abdomen: Soft, Non tender





- Back


Back: No CVA TTP, No spinal TTP





- Derm


Derm: Normal color, Warm and dry, No rash





- Extremities


Extremities: No deformity, Other (marked doughy pitting edema to bilateral LE 

and to the hands. )





- Neuro


Neuro: Alert and oriented X 3, CNs 2-12 intact, No motor deficit, No sensory 

deficit, Normal speech


Eye Opening: Spontaneous


Motor: Obeys Commands


Verbal: Oriented


GCS Score: 15





- Psych


Psych: Normal mood, Normal affect





Results





- Vitals


Vitals: 


                               Vital Signs - 24 hr











  10/23/18 10/23/18 10/23/18





  20:59 22:20 23:10


 


Temperature   


 


Heart Rate 93 98 100


 


Respiratory 15 17 18





Rate   


 


Blood Pressure 133/86 H 133/91 H 124/91 H


 


O2 Saturation 99  100














  10/24/18 10/24/18 10/24/18





  00:10 01:22 01:43


 


Temperature  37 C 36.9 C


 


Heart Rate 95 86 89


 


Respiratory 16 22 18





Rate   


 


Blood Pressure 132/89 H 121/76 125/83 H


 


O2 Saturation 98  














  10/24/18 10/24/18 10/24/18





  02:17 03:05 03:28


 


Temperature   36.8 C


 


Heart Rate 87 88 78


 


Respiratory 16 16 18





Rate   


 


Blood Pressure 112/79 106/64 111/75


 


O2 Saturation 97 97 














  10/24/18 10/24/18 10/24/18





  03:47 03:53 04:04


 


Temperature 36.8 C 36.8 C 36.8 C


 


Heart Rate 79 77 83


 


Respiratory 17 17 19





Rate   


 


Blood Pressure 111/66 109/73 110/67


 


O2 Saturation   














  10/24/18 10/24/18 10/24/18





  05:24 06:35 06:58


 


Temperature  36.7 C 


 


Heart Rate 75 84 142 H


 


Respiratory 15 16 





Rate   


 


Blood Pressure 114/69 109/69 


 


O2 Saturation 97  








                                     Oxygen











O2 Source                      Room air

















- EKG (time done)


  ** 2142


Rate: Rate (enter#) (86)


Rhythm: NSR


Ischemia: Q waves (lateral )


Compare to prior EKG: Changed from prior EKG (SPT 8-28-18 rate has decreased)


Computer interpretation: Agree with computer





- Labs


Labs: 


                                Laboratory Tests











  10/23/18 10/23/18 10/23/18





  21:45 21:45 21:45


 


WBC  10.5  


 


RBC  3.08 L  


 


Hgb  7.1 L  


 


Hct  22.2 L  


 


MCV  72.1 L  


 


MCH  23.2 L  


 


MCHC  32.1  


 


RDW  29.4 H  


 


Plt Count  480 H  


 


MPV  6.9 L  


 


Neut # (Auto)  8.5 H  


 


Lymph # (Auto)  0.6 L  


 


Mono # (Auto)  1.2 H  


 


Eos # (Auto)  0.1  


 


Baso # (Auto)  0.0  


 


Absolute Nucleated RBC  0.01  


 


Nucleated RBC %  0.1  


 


Manual Slide Review  Indicated  


 


Platelet Estimate  INCREASED (>450,000)  


 


Platelet Morphology  NORMAL APPEARANCE  


 


RBC Morph Micro Appear  1+ POLYCHROMASIA  


 


Sodium   134 L 


 


Potassium   4.0 


 


Chloride   102 


 


Carbon Dioxide   28 


 


Anion Gap   4.0 L 


 


BUN   15 


 


Creatinine   1.1 


 


Estimated GFR (MDRD)   82 L 


 


Glucose   103 H 


 


Calcium   7.8 L 


 


Total Bilirubin   0.5 


 


AST   17 


 


ALT   20 


 


Alkaline Phosphatase   37 L 


 


Troponin I    < 0.04


 


B-Natriuretic Peptide   


 


Total Protein   5.0 L 


 


Albumin   2.3 L 


 


Globulin   2.7 


 


Albumin/Globulin Ratio   0.9 L 


 


Lipase   31 


 


Blood Type   


 


Antibody Screen   


 


Crossmatch IS Only   














  10/23/18 10/23/18 10/24/18





  21:45 22:45 07:21


 


WBC   


 


RBC   


 


Hgb    8.2 L


 


Hct    24.3 L


 


MCV   


 


MCH   


 


MCHC   


 


RDW   


 


Plt Count   


 


MPV   


 


Neut # (Auto)   


 


Lymph # (Auto)   


 


Mono # (Auto)   


 


Eos # (Auto)   


 


Baso # (Auto)   


 


Absolute Nucleated RBC   


 


Nucleated RBC %   


 


Manual Slide Review   


 


Platelet Estimate   


 


Platelet Morphology   


 


RBC Morph Micro Appear   


 


Sodium   


 


Potassium   


 


Chloride   


 


Carbon Dioxide   


 


Anion Gap   


 


BUN   


 


Creatinine   


 


Estimated GFR (MDRD)   


 


Glucose   


 


Calcium   


 


Total Bilirubin   


 


AST   


 


ALT   


 


Alkaline Phosphatase   


 


Troponin I   


 


B-Natriuretic Peptide  137 H  


 


Total Protein   


 


Albumin   


 


Globulin   


 


Albumin/Globulin Ratio   


 


Lipase   


 


Blood Type   O POSITIVE 


 


Antibody Screen   NEGATIVE 


 


Crossmatch IS Only   See Detail 














Procedures





- IVC sono (time)


  ** 2125


Bedside IVC sono: IVC measures (cm) (1.12), IVC collapsed c insp (cm) (0.44), 

Dehydration





PD MEDICAL DECISION MAKING





- ED course


Complexity details: reviewed old records, reviewed results, re-evaluated 

patient, considered differential, d/w patient, d/w consultant (Janee Shafer 

hospitalist here. We have no beds. This patient will require several days of 

treatment and will need to be transferred. She recommends treatment with blood 

and diuretic.  )


ED course: 





23-year-old autistic male with ADHD has ulcerative colitis and this is recently 

been treated in the hospital and required a 4 unit transfusion.  Since that time

 he has gone to follow-up with gastroenterology has been placed on some 

vancomycin on the 12th of this month for C. difficile infection and he is 

continued on 60 mg of prednisone daily since the 12th of this month.  He now has

 anasarca and symptomatic anemia. admission to the hospital for transfusion and 

diuresis is sought and there are no beds available at [facility]. [facility] is contacted in the case and advise they would transfer this patient 

from their hospital. He is kept in the ED overnight with a transfusion begun and

 IV lasix. He is transfused 2 units of rbc's and repeat H&H raises from 7.1/22.2

 to 8.2/24.3. He continues to have orthostatic pulse increase and weak legs on 

standing. He feels he is doing poorly at home. [facility] in [place] is consu

lted in the case and Dr. Armenta is kind enough to accept the patient in transfer. 





Departure





- Departure


Disposition: 02 Transfer Acute Care Hosp


Clinical Impression: 


 Symptomatic anemia, Anasarca

## 2018-10-24 ENCOUNTER — HOSPITAL ENCOUNTER (OUTPATIENT)
Dept: HOSPITAL 76 - EMS | Age: 23
Discharge: ANOTHER HOSPITAL | End: 2018-10-24
Attending: SURGERY
Payer: MEDICAID

## 2018-10-24 VITALS — DIASTOLIC BLOOD PRESSURE: 86 MMHG | SYSTOLIC BLOOD PRESSURE: 117 MMHG

## 2018-10-24 DIAGNOSIS — K92.2: Primary | ICD-10-CM

## 2018-10-24 LAB — HGB UR QL STRIP: 8.2 G/DL (ref 14–18)

## 2018-11-29 ENCOUNTER — HOSPITAL ENCOUNTER (OUTPATIENT)
Dept: HOSPITAL 76 - LAB.N | Age: 23
Discharge: HOME | End: 2018-11-29
Attending: INTERNAL MEDICINE
Payer: MEDICAID

## 2018-11-29 DIAGNOSIS — K92.1: Primary | ICD-10-CM

## 2018-11-29 LAB
BASOPHILS NFR BLD AUTO: 0 10^3/UL (ref 0–0.1)
BASOPHILS NFR BLD AUTO: 0.6 %
EOSINOPHIL # BLD AUTO: 0 10^3/UL (ref 0–0.7)
EOSINOPHIL NFR BLD AUTO: 0.6 %
ERYTHROCYTE [DISTWIDTH] IN BLOOD BY AUTOMATED COUNT: 19.5 % (ref 12–15)
HGB UR QL STRIP: 10.1 G/DL (ref 14–18)
LYMPHOCYTES # SPEC AUTO: 0.5 10^3/UL (ref 1.5–3.5)
LYMPHOCYTES NFR BLD AUTO: 6.8 %
MCH RBC QN AUTO: 28.8 PG (ref 27–31)
MCHC RBC AUTO-ENTMCNC: 34.2 G/DL (ref 32–36)
MCV RBC AUTO: 84.2 FL (ref 80–94)
MONOCYTES # BLD AUTO: 1.3 10^3/UL (ref 0–1)
MONOCYTES NFR BLD AUTO: 18.4 %
NEUTROPHILS # BLD AUTO: 5 10^3/UL (ref 1.5–6.6)
NEUTROPHILS # SNV AUTO: 6.8 X10^3/UL (ref 4.8–10.8)
NEUTROPHILS NFR BLD AUTO: 73.6 %
PDW BLD AUTO: 7.4 FL (ref 7.4–11.4)
PLATELET # BLD: 342 10^3/UL (ref 130–450)
RBC MAR: 3.52 10^6/UL (ref 4.7–6.1)
RBC MORPH BLD: (no result)

## 2018-11-29 PROCEDURE — 36415 COLL VENOUS BLD VENIPUNCTURE: CPT

## 2018-11-29 PROCEDURE — 85025 COMPLETE CBC W/AUTO DIFF WBC: CPT

## 2019-01-28 ENCOUNTER — HOSPITAL ENCOUNTER (EMERGENCY)
Dept: HOSPITAL 76 - ED | Age: 24
Discharge: HOME | End: 2019-01-28
Payer: MEDICAID

## 2019-01-28 VITALS — SYSTOLIC BLOOD PRESSURE: 104 MMHG | DIASTOLIC BLOOD PRESSURE: 71 MMHG

## 2019-01-28 DIAGNOSIS — K92.1: ICD-10-CM

## 2019-01-28 DIAGNOSIS — Z98.890: ICD-10-CM

## 2019-01-28 DIAGNOSIS — R21: ICD-10-CM

## 2019-01-28 DIAGNOSIS — E87.6: Primary | ICD-10-CM

## 2019-01-28 LAB
ALBUMIN DIAFP-MCNC: 1.8 G/DL (ref 3.2–5.5)
ALBUMIN/GLOB SERPL: 0.6 {RATIO} (ref 1–2.2)
ALP SERPL-CCNC: 85 IU/L (ref 42–121)
ALT SERPL W P-5'-P-CCNC: 11 IU/L (ref 10–60)
ANION GAP SERPL CALCULATED.4IONS-SCNC: 11 MMOL/L (ref 6–13)
APTT PPP: 25.9 SECS (ref 24.9–33.3)
AST SERPL W P-5'-P-CCNC: 29 IU/L (ref 10–42)
BASOPHILS # BLD MANUAL: 0.2 10^3/UL (ref 0–0.1)
BASOPHILS NFR BLD AUTO: 0.3 %
BASOPHILS NFR SPEC MANUAL: 1 %
BILIRUB BLD-MCNC: 0.9 MG/DL (ref 0.2–1)
BUN SERPL-MCNC: 12 MG/DL (ref 6–20)
CALCIUM UR-MCNC: 7.3 MG/DL (ref 8.5–10.3)
CHLORIDE SERPL-SCNC: 95 MMOL/L (ref 101–111)
CO2 SERPL-SCNC: 26 MMOL/L (ref 21–32)
CREAT SERPLBLD-SCNC: 1.5 MG/DL (ref 0.6–1.2)
EOSINOPHIL # BLD MANUAL: 0.3 10^3/UL (ref 0–0.7)
EOSINOPHIL NFR BLD AUTO: 1.1 %
ERYTHROCYTE [DISTWIDTH] IN BLOOD BY AUTOMATED COUNT: 17.2 % (ref 12–15)
GFRSERPLBLD MDRD-ARVRAT: 57 ML/MIN/{1.73_M2} (ref 89–?)
GLOBULIN SER-MCNC: 2.9 G/DL (ref 2.1–4.2)
GLUCOSE SERPL-MCNC: 116 MG/DL (ref 70–100)
HGB UR QL STRIP: 11.4 G/DL (ref 14–18)
INR PPP: 1.3 (ref 0.8–1.2)
LIPASE SERPL-CCNC: 15 U/L (ref 22–51)
LYMPH ABN NFR BLD MANUAL: 0 %
LYMPHOBLASTS # BLD: 18 %
LYMPHOCYTES # BLD MANUAL: 4.6 10^3/UL (ref 1.5–3.5)
LYMPHOCYTES NFR BLD AUTO: 16.7 %
MANUAL DIF COMMENT BLD-IMP: (no result)
MCH RBC QN AUTO: 26.8 PG (ref 27–31)
MCHC RBC AUTO-ENTMCNC: 33.4 G/DL (ref 32–36)
MCV RBC AUTO: 80.2 FL (ref 80–94)
MONOCYTES # BLD MANUAL: 2 10^3/UL (ref 0–1)
MONOCYTES NFR BLD AUTO: 17 %
NEUTROPHILS # SNV AUTO: 17 X10^3/UL (ref 4.8–10.8)
NEUTROPHILS NFR BLD AUTO: 64.9 %
NEUTROPHILS NFR BLD MANUAL: 9.9 10^3/UL (ref 1.5–6.6)
NEUTS BAND NFR BLD MANUAL: 42 %
NEUTS BAND NFR BLD: 16 %
PDW BLD AUTO: 7.4 FL (ref 7.4–11.4)
PLATELET # BLD: 559 10^3/UL (ref 130–450)
PROT SPEC-MCNC: 4.7 G/DL (ref 6.7–8.2)
PROTHROM ACT/NOR PPP: 14.8 SECS (ref 9.9–12.6)
RBC MAR: 4.24 10^6/UL (ref 4.7–6.1)
RBC MORPH BLD: (no result)
SODIUM SERPLBLD-SCNC: 132 MMOL/L (ref 135–145)

## 2019-01-28 PROCEDURE — 80053 COMPREHEN METABOLIC PANEL: CPT

## 2019-01-28 PROCEDURE — 85610 PROTHROMBIN TIME: CPT

## 2019-01-28 PROCEDURE — 83690 ASSAY OF LIPASE: CPT

## 2019-01-28 PROCEDURE — 86900 BLOOD TYPING SEROLOGIC ABO: CPT

## 2019-01-28 PROCEDURE — 85730 THROMBOPLASTIN TIME PARTIAL: CPT

## 2019-01-28 PROCEDURE — 36415 COLL VENOUS BLD VENIPUNCTURE: CPT

## 2019-01-28 PROCEDURE — 99283 EMERGENCY DEPT VISIT LOW MDM: CPT

## 2019-01-28 PROCEDURE — 96365 THER/PROPH/DIAG IV INF INIT: CPT

## 2019-01-28 PROCEDURE — 86901 BLOOD TYPING SEROLOGIC RH(D): CPT

## 2019-01-28 PROCEDURE — 85025 COMPLETE CBC W/AUTO DIFF WBC: CPT

## 2019-01-28 PROCEDURE — 86850 RBC ANTIBODY SCREEN: CPT

## 2019-01-28 NOTE — ED PHYSICIAN DOCUMENTATION
PD HPI GI BLEED





- Stated complaint


Stated Complaint: BLOOD IN STOOL





- Chief complaint


Chief Complaint: Abd Pain





- History obtained from


History obtained from: Patient





- History of Present Illness


Timing - onset: How many weeks ago (several)


Timing - details: Still present


Associated symptoms: Maroon stool


Contributing factors: Other (History of ulcerative colitis, C. difficile 

colitis, and 2-1/2 weeks status post fecal transplant.)





- Additional information


Additional information: 


The patient is a 23-year-old male with history of ulcerative colitis, C. 

difficile colitis, status post fecal transplant 2 and half weeks ago, who 

presents with bloody stool that is been a daily occurrence for the past 2 weeks,

with increasing frequency of bloody stools over the past few days.  He denies 

abdominal pain.  He reports nausea with vomiting twice earlier today.  He 

reports lightheadedness.  He denies fever or dysuria.


He was previously treated with prednisone, but stopped the medication on his own

about one month ago, prior to the fecal transplant.  He is scheduled to see his 

gastroenterologist tomorrow to institute treatment with azathioprine and 

Remicade.








Review of Systems


Constitutional: reports: Fatigue, Other (lightheaded).  denies: Fever


Ears: denies: Tinnitus/ringing


Nose: denies: Congestion


Throat: denies: Sore throat


Cardiac: denies: Chest pain / pressure, Palpitations


Respiratory: denies: Dyspnea, Cough


GI: reports: Nausea, Vomiting, Bloody / black stool.  denies: Abdominal Pain


: denies: Dysuria


Skin: reports: Rash (Chronic facial rash.)


Musculoskeletal: denies: Back pain, Extremity swelling


Neurologic: denies: Focal weakness, Numbness, Headache





PD PAST MEDICAL HISTORY





- Past Medical History


Cardiovascular: None


Respiratory: None


Neuro: None


Endocrine/Autoimmune: None


GI: C.difficile, Ulcerative colitis


: None


HEENT: None


Psych: ADD/ADHD, Other


Musculoskeletal: None


Derm: None





- Past Surgical History


Past Surgical History: Yes


General: Colonoscopy, EGD, Other





- Present Medications


Home Medications: 


                                Ambulatory Orders











 Medication  Instructions  Recorded  Confirmed


 


Clonidine HCl [Catapres] 0.2 mg PO QPM 03/05/18 10/06/18


 


risperiDONE [Risperdal] 1 mg PO QPM 03/05/18 10/06/18


 


Dextroamphetamine/Amphetamine 30 mg PO .DAILY AT 10AM 06/13/18 10/06/18





[Dextroamp-Amphet ER 30 mg Cap]   


 


Mesalamine  12/08/18 12/08/18


 


Ondansetron Odt [Zofran] 4 mg TL Q6H PRN #10 tablet 12/08/18 














- Allergies


Allergies/Adverse Reactions: 


                                    Allergies











Allergy/AdvReac Type Severity Reaction Status Date / Time


 


No Known Drug Allergies Allergy   Verified 01/28/19 09:16














- Social History


Does the pt smoke?: No


Smoking Status: Never smoker


Does the pt drink ETOH?: No


Does the pt have substance abuse?: No





- Immunizations


Immunizations are current?: Yes





- POLST


Patient has POLST: No


POLST Status: Full Code





PD ED PE NORMAL





- Vitals


Vital signs reviewed: Yes (normal)





- General


General: Alert and oriented X 3, Well developed/nourished





- HEENT


HEENT: Atraumatic, Pharynx benign





- Neck


Neck: No adenopathy, No JVD





- Cardiac


Cardiac: RRR





- Respiratory


Respiratory: No respiratory distress, Clear bilaterally





- Abdomen


Abdomen: Soft, Non tender





- Male 


Male : Other (Rectal exam reveals maroon, heme-positive stool.)





- Back


Back: No CVA TTP





- Derm


Derm: Other (Erythematous rash on the maxillary prominences of the patient's 

face.  (The patient states this is improved from previous extent of his rash.))





- Extremities


Extremities: No edema, No calf tenderness / cord





- Neuro


Neuro: Alert and oriented X 3, No motor deficit, No sensory deficit





Results





- Vitals


Vitals: 


                               Vital Signs - 24 hr











  01/28/19





  12:07


 


Heart Rate 103 H


 


Respiratory 14





Rate 


 


Blood Pressure 104/71


 


O2 Saturation 100








                                     Oxygen











O2 Source                      Room air

















- Labs


Labs: 


                                Laboratory Tests











  01/28/19 01/28/19 01/28/19





  09:30 09:30 09:30


 


WBC   17.0 H 


 


RBC   4.24 L 


 


Hgb   11.4 L 


 


Hct   34.0 L 


 


MCV   80.2 


 


MCH   26.8 L 


 


MCHC   33.4 


 


RDW   17.2 H 


 


Plt Count   559 H 


 


MPV   7.4 


 


Neut # (Auto)   Not Reportable 


 


Lymph # (Auto)   Not Reportable 


 


Mono # (Auto)   Not Reportable 


 


Eos # (Auto)   Not Reportable 


 


Baso # (Auto)   Not Reportable 


 


Absolute Nucleated RBC   Not Reportable 


 


Total Counted   100 


 


Band Neuts % (Manual)   16 H 


 


Reactive Lymphs % (Man)   9 


 


Abnorm Lymph % (Manual)   0 


 


Nucleated RBC %   Not Reportable 


 


Neutrophils # (Manual)   9.9 H 


 


Lymphocytes # (Manual)   4.6 H 


 


Monocytes # (Manual)   2.0 H 


 


Eosinophils # (Manual)   0.3 


 


Basophils # (Manual)   0.2 H 


 


Differential Comment   MANUAL DIFFERENTIAL 


 


RBC Morph Micro Appear   1+ TARGET CELLS 


 


PT    14.8 H


 


INR    1.3 H


 


APTT    25.9


 


Sodium   


 


Potassium   


 


Chloride   


 


Carbon Dioxide   


 


Anion Gap   


 


BUN   


 


Creatinine   


 


Estimated GFR (MDRD)   


 


Glucose   


 


Calcium   


 


Total Bilirubin   


 


AST   


 


ALT   


 


Alkaline Phosphatase   


 


Total Protein   


 


Albumin   


 


Globulin   


 


Albumin/Globulin Ratio   


 


Lipase   


 


Blood Type  O POSITIVE  


 


Antibody Screen  NEGATIVE  














  01/28/19





  09:30


 


WBC 


 


RBC 


 


Hgb 


 


Hct 


 


MCV 


 


MCH 


 


MCHC 


 


RDW 


 


Plt Count 


 


MPV 


 


Neut # (Auto) 


 


Lymph # (Auto) 


 


Mono # (Auto) 


 


Eos # (Auto) 


 


Baso # (Auto) 


 


Absolute Nucleated RBC 


 


Total Counted 


 


Band Neuts % (Manual) 


 


Reactive Lymphs % (Man) 


 


Abnorm Lymph % (Manual) 


 


Nucleated RBC % 


 


Neutrophils # (Manual) 


 


Lymphocytes # (Manual) 


 


Monocytes # (Manual) 


 


Eosinophils # (Manual) 


 


Basophils # (Manual) 


 


Differential Comment 


 


RBC Morph Micro Appear 


 


PT 


 


INR 


 


APTT 


 


Sodium  132 L


 


Potassium  2.6 L


 


Chloride  95 L


 


Carbon Dioxide  26


 


Anion Gap  11.0


 


BUN  12


 


Creatinine  1.5 H


 


Estimated GFR (MDRD)  57 L


 


Glucose  116 H


 


Calcium  7.3 L


 


Total Bilirubin  0.9


 


AST  29


 


ALT  11


 


Alkaline Phosphatase  85


 


Total Protein  4.7 L


 


Albumin  1.8 L


 


Globulin  2.9


 


Albumin/Globulin Ratio  0.6 L


 


Lipase  15 L


 


Blood Type 


 


Antibody Screen 














PD MEDICAL DECISION MAKING





- ED course


Complexity details: reviewed old records, reviewed results, re-evaluated 

patient, considered differential, d/w patient, d/w consultant


ED course: 


The patient's presentation is significant for GI bleed, 2 and half weeks status 

post fecal transplant for C. difficile.  He also has history of ulcerative 

colitis for which he was previously treated with prednisone, but which he 

stopped about one month ago.  His hemoglobin and hematocrit are is presently 

adequate at 11.4 and 34.0.  His white blood cell count is elevated at 17.0.  His

 chemistry panel reveals hypokalemia with potassium of 2.6, which may also 

contribute to the patient's feeling of weakness.


I discussed his condition with the assistant to his gastroenterologist, Dr. Oliver.  She advises that the patient should be started on azathioprine, and 

follow-up with Dr. Oliver tomorrow as scheduled.


Treatment in the emergency department included administration of azathioprine, 

50 mg orally, potassium 20 mEq orally, and 10 mEq IV, and normal saline 1 L IV. 

 I discussed with him the importance of follow-up with his gastroenterologist 

tomorrow, as well as potentially worrisome signs or symptoms that should prompt 

reevaluation in the emergency department.








Departure





- Departure


Disposition: 01 Home, Self Care


Clinical Impression: 


 S/P fecal transplant, Hypokalemia





GI bleed


Qualifiers:


 GI bleed type/associated pathology: unspecified gastrointestinal hemorrhage 

type Qualified Code(s): K92.2 - Gastrointestinal hemorrhage, unspecified





Condition: Stable


Instructions:  ED Hematochezia Stable


Follow-Up: 


Kathy Roberts ARNP [Primary Care Provider] - 


Jacob Oliver MD [Provider Admit Priv/Credential] - 


Comments: 


Follow-up with Dr. Oliver tomorrow as planned.


Return to the emergency department if you develop increasing GI bleeding, 

progressive lightheadedness, or otherwise worsening symptoms.


Discharge Date/Time: 01/28/19 12:44

## 2019-02-22 ENCOUNTER — HOSPITAL ENCOUNTER (OUTPATIENT)
Dept: HOSPITAL 76 - LAB.N | Age: 24
Discharge: HOME | End: 2019-02-22
Attending: INTERNAL MEDICINE
Payer: MEDICAID

## 2019-02-22 DIAGNOSIS — R19.7: ICD-10-CM

## 2019-02-22 DIAGNOSIS — R11.2: Primary | ICD-10-CM

## 2019-02-22 DIAGNOSIS — K51.90: ICD-10-CM

## 2019-02-22 LAB
ALBUMIN DIAFP-MCNC: 1.6 G/DL (ref 3.2–5.5)
ALBUMIN/GLOB SERPL: 0.6 {RATIO} (ref 1–2.2)
ALP SERPL-CCNC: 72 IU/L (ref 42–121)
ALT SERPL W P-5'-P-CCNC: 13 IU/L (ref 10–60)
ANION GAP SERPL CALCULATED.4IONS-SCNC: 7 MMOL/L (ref 6–13)
AST SERPL W P-5'-P-CCNC: 17 IU/L (ref 10–42)
BASOPHILS NFR BLD AUTO: 0 10^3/UL (ref 0–0.1)
BASOPHILS NFR BLD AUTO: 0.7 %
BILIRUB BLD-MCNC: 0.8 MG/DL (ref 0.2–1)
BUN SERPL-MCNC: 13 MG/DL (ref 6–20)
CALCIUM UR-MCNC: 7.5 MG/DL (ref 8.5–10.3)
CHLORIDE SERPL-SCNC: 101 MMOL/L (ref 101–111)
CO2 SERPL-SCNC: 28 MMOL/L (ref 21–32)
CREAT SERPLBLD-SCNC: 1.4 MG/DL (ref 0.6–1.2)
EOSINOPHIL # BLD AUTO: 0 10^3/UL (ref 0–0.7)
EOSINOPHIL NFR BLD AUTO: 0.9 %
ERYTHROCYTE [DISTWIDTH] IN BLOOD BY AUTOMATED COUNT: 18.3 % (ref 12–15)
GFRSERPLBLD MDRD-ARVRAT: 62 ML/MIN/{1.73_M2} (ref 89–?)
GLOBULIN SER-MCNC: 2.5 G/DL (ref 2.1–4.2)
GLUCOSE SERPL-MCNC: 101 MG/DL (ref 70–100)
HGB UR QL STRIP: 9.3 G/DL (ref 14–18)
LYMPHOCYTES # SPEC AUTO: 1 10^3/UL (ref 1.5–3.5)
LYMPHOCYTES NFR BLD AUTO: 20.5 %
MCH RBC QN AUTO: 25.3 PG (ref 27–31)
MCHC RBC AUTO-ENTMCNC: 31.6 G/DL (ref 32–36)
MCV RBC AUTO: 80 FL (ref 80–94)
MONOCYTES # BLD AUTO: 0.7 10^3/UL (ref 0–1)
MONOCYTES NFR BLD AUTO: 15.3 %
NEUTROPHILS # BLD AUTO: 3.1 10^3/UL (ref 1.5–6.6)
NEUTROPHILS # SNV AUTO: 4.9 X10^3/UL (ref 4.8–10.8)
NEUTROPHILS NFR BLD AUTO: 62.6 %
PDW BLD AUTO: 7.6 FL (ref 7.4–11.4)
PLATELET # BLD: 516 10^3/UL (ref 130–450)
PROT SPEC-MCNC: 4.1 G/DL (ref 6.7–8.2)
RBC MAR: 3.68 10^6/UL (ref 4.7–6.1)
SODIUM SERPLBLD-SCNC: 136 MMOL/L (ref 135–145)

## 2019-02-22 PROCEDURE — 80053 COMPREHEN METABOLIC PANEL: CPT

## 2019-02-22 PROCEDURE — 36415 COLL VENOUS BLD VENIPUNCTURE: CPT

## 2019-02-22 PROCEDURE — 87493 C DIFF AMPLIFIED PROBE: CPT

## 2019-02-22 PROCEDURE — 85025 COMPLETE CBC W/AUTO DIFF WBC: CPT

## 2019-02-28 ENCOUNTER — HOSPITAL ENCOUNTER (OUTPATIENT)
Dept: HOSPITAL 76 - LAB.N | Age: 24
Discharge: HOME | End: 2019-02-28
Attending: INTERNAL MEDICINE
Payer: MEDICAID

## 2019-02-28 DIAGNOSIS — K51.90: ICD-10-CM

## 2019-02-28 DIAGNOSIS — R11.2: Primary | ICD-10-CM

## 2019-02-28 DIAGNOSIS — R19.7: ICD-10-CM

## 2019-02-28 LAB
ALBUMIN DIAFP-MCNC: 1.5 G/DL (ref 3.2–5.5)
ALBUMIN/GLOB SERPL: 0.7 {RATIO} (ref 1–2.2)
ALP SERPL-CCNC: 69 IU/L (ref 42–121)
ALT SERPL W P-5'-P-CCNC: 13 IU/L (ref 10–60)
ANION GAP SERPL CALCULATED.4IONS-SCNC: 6 MMOL/L (ref 6–13)
AST SERPL W P-5'-P-CCNC: 22 IU/L (ref 10–42)
BASOPHILS NFR BLD AUTO: 0.1 10^3/UL (ref 0–0.1)
BASOPHILS NFR BLD AUTO: 1.4 %
BILIRUB BLD-MCNC: 0.6 MG/DL (ref 0.2–1)
BUN SERPL-MCNC: 8 MG/DL (ref 6–20)
CALCIUM UR-MCNC: 7.4 MG/DL (ref 8.5–10.3)
CHLORIDE SERPL-SCNC: 101 MMOL/L (ref 101–111)
CLARITY UR REFRACT.AUTO: CLEAR
CO2 SERPL-SCNC: 29 MMOL/L (ref 21–32)
CREAT SERPLBLD-SCNC: 1 MG/DL (ref 0.6–1.2)
EOSINOPHIL # BLD AUTO: 0 10^3/UL (ref 0–0.7)
EOSINOPHIL NFR BLD AUTO: 0.2 %
ERYTHROCYTE [DISTWIDTH] IN BLOOD BY AUTOMATED COUNT: 18.5 % (ref 12–15)
GFRSERPLBLD MDRD-ARVRAT: 92 ML/MIN/{1.73_M2} (ref 89–?)
GLOBULIN SER-MCNC: 2.3 G/DL (ref 2.1–4.2)
GLUCOSE SERPL-MCNC: 86 MG/DL (ref 70–100)
GLUCOSE UR QL STRIP.AUTO: NEGATIVE MG/DL
HGB UR QL STRIP: 8.9 G/DL (ref 14–18)
KETONES UR QL STRIP.AUTO: NEGATIVE MG/DL
LYMPHOCYTES # SPEC AUTO: 1.3 10^3/UL (ref 1.5–3.5)
LYMPHOCYTES NFR BLD AUTO: 30.5 %
MCH RBC QN AUTO: 25.2 PG (ref 27–31)
MCHC RBC AUTO-ENTMCNC: 32.3 G/DL (ref 32–36)
MCV RBC AUTO: 78.3 FL (ref 80–94)
MONOCYTES # BLD AUTO: 0.4 10^3/UL (ref 0–1)
MONOCYTES NFR BLD AUTO: 9.5 %
NEUTROPHILS # BLD AUTO: 2.5 10^3/UL (ref 1.5–6.6)
NEUTROPHILS # SNV AUTO: 4.3 X10^3/UL (ref 4.8–10.8)
NEUTROPHILS NFR BLD AUTO: 58.4 %
NITRITE UR QL STRIP.AUTO: NEGATIVE
PDW BLD AUTO: 7.4 FL (ref 7.4–11.4)
PH UR STRIP.AUTO: 7 PH (ref 5–7.5)
PLATELET # BLD: 412 10^3/UL (ref 130–450)
PROT SPEC-MCNC: 3.8 G/DL (ref 6.7–8.2)
PROT UR STRIP.AUTO-MCNC: NEGATIVE MG/DL
RBC # UR STRIP.AUTO: (no result) /UL
RBC MAR: 3.54 10^6/UL (ref 4.7–6.1)
SODIUM SERPLBLD-SCNC: 136 MMOL/L (ref 135–145)
SP GR UR STRIP.AUTO: 1.01 (ref 1–1.03)
UROBILINOGEN UR QL STRIP.AUTO: (no result) E.U./DL
UROBILINOGEN UR STRIP.AUTO-MCNC: NEGATIVE MG/DL

## 2019-02-28 PROCEDURE — 36415 COLL VENOUS BLD VENIPUNCTURE: CPT

## 2019-02-28 PROCEDURE — 85025 COMPLETE CBC W/AUTO DIFF WBC: CPT

## 2019-02-28 PROCEDURE — 81003 URINALYSIS AUTO W/O SCOPE: CPT

## 2019-02-28 PROCEDURE — 80053 COMPREHEN METABOLIC PANEL: CPT

## 2019-06-12 NOTE — DISCHARGE PLAN
Discharge Plan


Disposition: 01 Home, Self Care


Prescriptions: 


predniSONE [Prednisone] 40 mg PO DAILY #35 tablet


Diet: Regular


Activity Restrictions: No Restrictions


Shower Restrictions: No


Driving Restrictions: No


Weight Bearing: Full Weight


No Smoking: If you smoke, Please STOP!  Call [phone] for help.


Follow-up with: 


Kathy Roberts ARNP [Primary Care Provider] - 2 Weeks


Peewee Flood MD [Provider Admit Priv/Credential] - 07/05/18
Statement Selected

## 2020-01-21 ENCOUNTER — HOSPITAL ENCOUNTER (OUTPATIENT)
Dept: HOSPITAL 76 - LAB.N | Age: 25
Discharge: HOME | End: 2020-01-21
Attending: NURSE PRACTITIONER
Payer: MEDICAID

## 2020-01-21 DIAGNOSIS — Z79.899: ICD-10-CM

## 2020-01-21 DIAGNOSIS — Z00.00: Primary | ICD-10-CM

## 2020-01-21 LAB
ALBUMIN DIAFP-MCNC: 4.2 G/DL (ref 3.2–5.5)
ALBUMIN/GLOB SERPL: 1.7 {RATIO} (ref 1–2.2)
ALP SERPL-CCNC: 62 IU/L (ref 42–121)
ALT SERPL W P-5'-P-CCNC: 17 IU/L (ref 10–60)
ANION GAP SERPL CALCULATED.4IONS-SCNC: 7 MMOL/L (ref 6–13)
AST SERPL W P-5'-P-CCNC: 22 IU/L (ref 10–42)
BASOPHILS NFR BLD AUTO: 0 10^3/UL (ref 0–0.1)
BASOPHILS NFR BLD AUTO: 0.9 %
BILIRUB BLD-MCNC: 1.5 MG/DL (ref 0.2–1)
BUN SERPL-MCNC: 14 MG/DL (ref 6–20)
CALCIUM UR-MCNC: 9.1 MG/DL (ref 8.5–10.3)
CHLORIDE SERPL-SCNC: 101 MMOL/L (ref 101–111)
CHOLEST SERPL-MCNC: 153 MG/DL
CO2 SERPL-SCNC: 29 MMOL/L (ref 21–32)
CREAT SERPLBLD-SCNC: 0.9 MG/DL (ref 0.6–1.2)
EOSINOPHIL # BLD AUTO: 0.1 10^3/UL (ref 0–0.7)
EOSINOPHIL NFR BLD AUTO: 2.2 %
ERYTHROCYTE [DISTWIDTH] IN BLOOD BY AUTOMATED COUNT: 13.1 % (ref 12–15)
GFRSERPLBLD MDRD-ARVRAT: 103 ML/MIN/{1.73_M2} (ref 89–?)
GLOBULIN SER-MCNC: 2.5 G/DL (ref 2.1–4.2)
GLUCOSE SERPL-MCNC: 86 MG/DL (ref 70–100)
HDLC SERPL-MCNC: 43 MG/DL
HDLC SERPL: 3.6 {RATIO} (ref ?–5)
HGB UR QL STRIP: 14.1 G/DL (ref 14–18)
LDLC SERPL CALC-MCNC: 91 MG/DL
LDLC/HDLC SERPL: 2.1 {RATIO} (ref ?–3.6)
LYMPHOCYTES # SPEC AUTO: 0.8 10^3/UL (ref 1.5–3.5)
LYMPHOCYTES NFR BLD AUTO: 17.6 %
MCH RBC QN AUTO: 29 PG (ref 27–31)
MCHC RBC AUTO-ENTMCNC: 33.5 G/DL (ref 32–36)
MCV RBC AUTO: 86.4 FL (ref 80–94)
MONOCYTES # BLD AUTO: 0.9 10^3/UL (ref 0–1)
MONOCYTES NFR BLD AUTO: 18.9 %
NEUTROPHILS # BLD AUTO: 2.7 10^3/UL (ref 1.5–6.6)
NEUTROPHILS # SNV AUTO: 4.5 X10^3/UL (ref 4.8–10.8)
NEUTROPHILS NFR BLD AUTO: 60.2 %
PDW BLD AUTO: 11.2 FL (ref 7.4–11.4)
PLATELET # BLD: 204 10^3/UL (ref 130–450)
PROT SPEC-MCNC: 6.7 G/DL (ref 6.7–8.2)
RBC MAR: 4.87 10^6/UL (ref 4.7–6.1)
SODIUM SERPLBLD-SCNC: 137 MMOL/L (ref 135–145)
VLDLC SERPL-SCNC: 19 MG/DL

## 2020-01-21 PROCEDURE — 83721 ASSAY OF BLOOD LIPOPROTEIN: CPT

## 2020-01-21 PROCEDURE — 80061 LIPID PANEL: CPT

## 2020-01-21 PROCEDURE — 36415 COLL VENOUS BLD VENIPUNCTURE: CPT

## 2020-01-21 PROCEDURE — 80053 COMPREHEN METABOLIC PANEL: CPT

## 2020-01-21 PROCEDURE — 85025 COMPLETE CBC W/AUTO DIFF WBC: CPT

## 2020-01-21 PROCEDURE — 84443 ASSAY THYROID STIM HORMONE: CPT

## 2020-03-23 ENCOUNTER — HOSPITAL ENCOUNTER (OUTPATIENT)
Dept: HOSPITAL 76 - LAB.N | Age: 25
Discharge: HOME | End: 2020-03-23
Attending: NURSE PRACTITIONER
Payer: MEDICAID

## 2020-03-23 DIAGNOSIS — R00.0: ICD-10-CM

## 2020-03-23 DIAGNOSIS — D64.9: Primary | ICD-10-CM

## 2020-03-23 LAB
BASOPHILS NFR BLD AUTO: 0.1 10^3/UL (ref 0–0.1)
BASOPHILS NFR BLD AUTO: 1 %
EOSINOPHIL # BLD AUTO: 0.2 10^3/UL (ref 0–0.7)
EOSINOPHIL NFR BLD AUTO: 3.4 %
ERYTHROCYTE [DISTWIDTH] IN BLOOD BY AUTOMATED COUNT: 13.7 % (ref 12–15)
HGB UR QL STRIP: 13.6 G/DL (ref 14–18)
LYMPHOCYTES # SPEC AUTO: 0.8 10^3/UL (ref 1.5–3.5)
LYMPHOCYTES NFR BLD AUTO: 16.7 %
MCH RBC QN AUTO: 29.4 PG (ref 27–31)
MCHC RBC AUTO-ENTMCNC: 34.3 G/DL (ref 32–36)
MCV RBC AUTO: 85.7 FL (ref 80–94)
MONOCYTES # BLD AUTO: 0.7 10^3/UL (ref 0–1)
MONOCYTES NFR BLD AUTO: 14.5 %
NEUTROPHILS # BLD AUTO: 3.2 10^3/UL (ref 1.5–6.6)
NEUTROPHILS # SNV AUTO: 5 X10^3/UL (ref 4.8–10.8)
NEUTROPHILS NFR BLD AUTO: 64 %
PDW BLD AUTO: 10.8 FL (ref 7.4–11.4)
PLATELET # BLD: 254 10^3/UL (ref 130–450)
RBC MAR: 4.62 10^6/UL (ref 4.7–6.1)

## 2020-03-23 PROCEDURE — 85025 COMPLETE CBC W/AUTO DIFF WBC: CPT

## 2020-03-23 PROCEDURE — 36415 COLL VENOUS BLD VENIPUNCTURE: CPT

## 2020-03-25 ENCOUNTER — HOSPITAL ENCOUNTER (OUTPATIENT)
Dept: HOSPITAL 76 - LAB.N | Age: 25
Discharge: HOME | End: 2020-03-25
Attending: INTERNAL MEDICINE
Payer: MEDICAID

## 2020-03-25 DIAGNOSIS — K51.90: Primary | ICD-10-CM

## 2020-03-25 LAB
ALBUMIN DIAFP-MCNC: 4 G/DL (ref 3.2–5.5)
ALBUMIN/GLOB SERPL: 1.7 {RATIO} (ref 1–2.2)
ALP SERPL-CCNC: 52 IU/L (ref 42–121)
ALT SERPL W P-5'-P-CCNC: 16 IU/L (ref 10–60)
ANION GAP SERPL CALCULATED.4IONS-SCNC: 8 MMOL/L (ref 6–13)
AST SERPL W P-5'-P-CCNC: 22 IU/L (ref 10–42)
BASOPHILS NFR BLD AUTO: 0.1 10^3/UL (ref 0–0.1)
BASOPHILS NFR BLD AUTO: 1 %
BILIRUB BLD-MCNC: 0.9 MG/DL (ref 0.2–1)
BUN SERPL-MCNC: 15 MG/DL (ref 6–20)
CALCIUM UR-MCNC: 8.8 MG/DL (ref 8.5–10.3)
CHLORIDE SERPL-SCNC: 102 MMOL/L (ref 101–111)
CO2 SERPL-SCNC: 27 MMOL/L (ref 21–32)
CREAT SERPLBLD-SCNC: 0.9 MG/DL (ref 0.6–1.2)
EOSINOPHIL # BLD AUTO: 0.1 10^3/UL (ref 0–0.7)
EOSINOPHIL NFR BLD AUTO: 1.5 %
ERYTHROCYTE [DISTWIDTH] IN BLOOD BY AUTOMATED COUNT: 13.7 % (ref 12–15)
GLOBULIN SER-MCNC: 2.4 G/DL (ref 2.1–4.2)
GLUCOSE SERPL-MCNC: 82 MG/DL (ref 70–100)
HGB UR QL STRIP: 13.1 G/DL (ref 14–18)
LYMPHOCYTES # SPEC AUTO: 1 10^3/UL (ref 1.5–3.5)
LYMPHOCYTES NFR BLD AUTO: 19 %
MCH RBC QN AUTO: 28.1 PG (ref 27–31)
MCHC RBC AUTO-ENTMCNC: 32.8 G/DL (ref 32–36)
MCV RBC AUTO: 85.8 FL (ref 80–94)
MONOCYTES # BLD AUTO: 0.7 10^3/UL (ref 0–1)
MONOCYTES NFR BLD AUTO: 13.9 %
NEUTROPHILS # BLD AUTO: 3.4 10^3/UL (ref 1.5–6.6)
NEUTROPHILS # SNV AUTO: 5.3 X10^3/UL (ref 4.8–10.8)
NEUTROPHILS NFR BLD AUTO: 64.4 %
PDW BLD AUTO: 11.1 FL (ref 7.4–11.4)
PLATELET # BLD: 247 10^3/UL (ref 130–450)
PROT SPEC-MCNC: 6.4 G/DL (ref 6.7–8.2)
RBC MAR: 4.66 10^6/UL (ref 4.7–6.1)
SODIUM SERPLBLD-SCNC: 137 MMOL/L (ref 135–145)

## 2020-03-25 PROCEDURE — 36415 COLL VENOUS BLD VENIPUNCTURE: CPT

## 2020-03-25 PROCEDURE — 85025 COMPLETE CBC W/AUTO DIFF WBC: CPT

## 2020-03-25 PROCEDURE — 80053 COMPREHEN METABOLIC PANEL: CPT

## 2020-10-23 ENCOUNTER — HOSPITAL ENCOUNTER (OUTPATIENT)
Dept: HOSPITAL 76 - COV | Age: 25
Discharge: HOME | End: 2020-10-23
Attending: FAMILY MEDICINE
Payer: MEDICAID

## 2020-10-23 DIAGNOSIS — Z20.828: ICD-10-CM

## 2020-10-23 DIAGNOSIS — R53.83: Primary | ICD-10-CM

## 2020-12-15 ENCOUNTER — HOSPITAL ENCOUNTER (OUTPATIENT)
Dept: HOSPITAL 76 - LAB.WCP | Age: 25
End: 2020-12-15
Attending: PHYSICIAN ASSISTANT
Payer: MEDICAID

## 2020-12-15 DIAGNOSIS — E55.9: Primary | ICD-10-CM

## 2020-12-15 DIAGNOSIS — R53.83: ICD-10-CM

## 2020-12-15 DIAGNOSIS — Z79.899: ICD-10-CM

## 2020-12-15 LAB — VIT B12 SERPL-MCNC: 475 PG/ML (ref 180–914)

## 2020-12-15 PROCEDURE — 82607 VITAMIN B-12: CPT

## 2020-12-15 PROCEDURE — 82306 VITAMIN D 25 HYDROXY: CPT

## 2020-12-15 PROCEDURE — 84443 ASSAY THYROID STIM HORMONE: CPT

## 2020-12-15 PROCEDURE — 36415 COLL VENOUS BLD VENIPUNCTURE: CPT

## 2021-11-03 ENCOUNTER — HOSPITAL ENCOUNTER (OUTPATIENT)
Dept: HOSPITAL 76 - LAB.WCP | Age: 26
Discharge: HOME | End: 2021-11-03
Attending: INTERNAL MEDICINE
Payer: MEDICAID

## 2021-11-03 DIAGNOSIS — K51.90: Primary | ICD-10-CM

## 2021-11-03 LAB
BASOPHILS NFR BLD AUTO: 0 10^3/UL (ref 0–0.1)
BASOPHILS NFR BLD AUTO: 0.9 %
CRP SERPL-MCNC: < 1 MG/DL (ref 0–1)
EOSINOPHIL # BLD AUTO: 0.2 10^3/UL (ref 0–0.7)
EOSINOPHIL NFR BLD AUTO: 3.3 %
ERYTHROCYTE [DISTWIDTH] IN BLOOD BY AUTOMATED COUNT: 13.2 % (ref 12–15)
HCT VFR BLD AUTO: 42.2 % (ref 42–52)
HGB UR QL STRIP: 14.4 G/DL (ref 14–18)
IRON SATN MFR SERPL: 27 % (ref 20–50)
IRON SERPL-MCNC: 98 UG/DL (ref 45–182)
LYMPHOCYTES # SPEC AUTO: 1 10^3/UL (ref 1.5–3.5)
LYMPHOCYTES NFR BLD AUTO: 21.5 %
MCH RBC QN AUTO: 29.9 PG (ref 27–31)
MCHC RBC AUTO-ENTMCNC: 34.1 G/DL (ref 32–36)
MCV RBC AUTO: 87.6 FL (ref 80–94)
MONOCYTES # BLD AUTO: 0.7 10^3/UL (ref 0–1)
MONOCYTES NFR BLD AUTO: 15.4 %
NEUTROPHILS # BLD AUTO: 2.7 10^3/UL (ref 1.5–6.6)
NEUTROPHILS # SNV AUTO: 4.6 X10^3/UL (ref 4.8–10.8)
NEUTROPHILS NFR BLD AUTO: 58.7 %
NRBC # BLD AUTO: 0 /100WBC
NRBC # BLD AUTO: 0 X10^3/UL
PDW BLD AUTO: 10.5 FL (ref 7.4–11.4)
PLATELET # BLD: 175 10^3/UL (ref 130–450)
RBC MAR: 4.82 10^6/UL (ref 4.7–6.1)
TIBC SERPL-MCNC: 360 UG/DL (ref 250–450)
TRANSFERRIN SERPL-MCNC: 257 MG/DL (ref 180–329)

## 2021-11-03 PROCEDURE — 84466 ASSAY OF TRANSFERRIN: CPT

## 2021-11-03 PROCEDURE — 36415 COLL VENOUS BLD VENIPUNCTURE: CPT

## 2021-11-03 PROCEDURE — 86140 C-REACTIVE PROTEIN: CPT

## 2021-11-03 PROCEDURE — 83993 ASSAY FOR CALPROTECTIN FECAL: CPT

## 2021-11-03 PROCEDURE — 85025 COMPLETE CBC W/AUTO DIFF WBC: CPT

## 2021-11-03 PROCEDURE — 83540 ASSAY OF IRON: CPT

## 2021-11-03 PROCEDURE — 85651 RBC SED RATE NONAUTOMATED: CPT

## 2022-01-11 ENCOUNTER — HOSPITAL ENCOUNTER (OUTPATIENT)
Dept: HOSPITAL 76 - LAB.WCP | Age: 27
Discharge: HOME | End: 2022-01-11
Attending: INTERNAL MEDICINE
Payer: MEDICAID

## 2022-01-11 DIAGNOSIS — F32.A: ICD-10-CM

## 2022-01-11 DIAGNOSIS — Z79.899: ICD-10-CM

## 2022-01-11 DIAGNOSIS — K51.911: Primary | ICD-10-CM

## 2022-01-11 LAB
ALBUMIN DIAFP-MCNC: 4.3 G/DL (ref 3.2–5.5)
ALBUMIN/GLOB SERPL: 1.5 {RATIO} (ref 1–2.2)
ALP SERPL-CCNC: 49 IU/L (ref 42–121)
ALT SERPL W P-5'-P-CCNC: 76 IU/L (ref 10–60)
ANION GAP SERPL CALCULATED.4IONS-SCNC: 7 MMOL/L (ref 6–13)
AST SERPL W P-5'-P-CCNC: 56 IU/L (ref 10–42)
BASOPHILS NFR BLD AUTO: 0.1 10^3/UL (ref 0–0.1)
BASOPHILS NFR BLD AUTO: 1 %
BILIRUB BLD-MCNC: 1.7 MG/DL (ref 0.2–1)
BUN SERPL-MCNC: 13 MG/DL (ref 6–20)
CALCIUM UR-MCNC: 9.4 MG/DL (ref 8.5–10.3)
CHLORIDE SERPL-SCNC: 104 MMOL/L (ref 101–111)
CHOLEST SERPL-MCNC: 176 MG/DL
CO2 SERPL-SCNC: 27 MMOL/L (ref 21–32)
CREAT SERPLBLD-SCNC: 0.9 MG/DL (ref 0.6–1.2)
CREAT UR-SCNC: 134.6 MG/DL
EOSINOPHIL # BLD AUTO: 0.1 10^3/UL (ref 0–0.7)
EOSINOPHIL NFR BLD AUTO: 1.6 %
ERYTHROCYTE [DISTWIDTH] IN BLOOD BY AUTOMATED COUNT: 12.8 % (ref 12–15)
EST. AVERAGE GLUCOSE BLD GHB EST-MCNC: 82 MG/DL (ref 70–100)
FERRITIN SERPL-MCNC: 26.1 NG/ML (ref 23.9–336.2)
GFRSERPLBLD MDRD-ARVRAT: 101 ML/MIN/{1.73_M2} (ref 89–?)
GLOBULIN SER-MCNC: 2.9 G/DL (ref 2.1–4.2)
GLUCOSE SERPL-MCNC: 88 MG/DL (ref 70–100)
HBA1C MFR BLD HPLC: 4.5 % (ref 4.27–6.07)
HCT VFR BLD AUTO: 44.4 % (ref 42–52)
HDLC SERPL-MCNC: 50 MG/DL
HDLC SERPL: 3.5 {RATIO} (ref ?–5)
HGB UR QL STRIP: 15.5 G/DL (ref 14–18)
IRON SATN MFR SERPL: 26 % (ref 20–50)
IRON SERPL-MCNC: 106 UG/DL (ref 45–182)
LDLC SERPL CALC-MCNC: 116 MG/DL
LDLC/HDLC SERPL: 2.3 {RATIO} (ref ?–3.6)
LH SERPL-ACNC: 4.22 MIU/ML
LYMPHOCYTES # SPEC AUTO: 1.2 10^3/UL (ref 1.5–3.5)
LYMPHOCYTES NFR BLD AUTO: 23.2 %
MCH RBC QN AUTO: 29.9 PG (ref 27–31)
MCHC RBC AUTO-ENTMCNC: 34.9 G/DL (ref 32–36)
MCV RBC AUTO: 85.5 FL (ref 80–94)
MICROALBUMIN UR-MCNC: 0.3 MG/DL (ref 0–300)
MICROALBUMIN/CREAT RATIO PNL UR: 2.2 UG/MG (ref ?–30)
MONOCYTES # BLD AUTO: 0.6 10^3/UL (ref 0–1)
MONOCYTES NFR BLD AUTO: 12.5 %
NEUTROPHILS # BLD AUTO: 3.1 10^3/UL (ref 1.5–6.6)
NEUTROPHILS # SNV AUTO: 5 X10^3/UL (ref 4.8–10.8)
NEUTROPHILS NFR BLD AUTO: 61.1 %
NRBC # BLD AUTO: 0 /100WBC
NRBC # BLD AUTO: 0 X10^3/UL
PDW BLD AUTO: 10.4 FL (ref 7.4–11.4)
PLATELET # BLD: 181 10^3/UL (ref 130–450)
POTASSIUM SERPL-SCNC: 4.2 MMOL/L (ref 3.5–5)
PROLACTIN SERPL-MCNC: 13.42 NG/ML
PROT SPEC-MCNC: 7.2 G/DL (ref 6.7–8.2)
RBC MAR: 5.19 10^6/UL (ref 4.7–6.1)
SODIUM SERPLBLD-SCNC: 138 MMOL/L (ref 135–145)
TIBC SERPL-MCNC: 413 UG/DL (ref 250–450)
TRANSFERRIN SERPL-MCNC: 295 MG/DL (ref 180–329)
TRIGL P FAST SERPL-MCNC: 51 MG/DL
TSH SERPL-ACNC: 1.44 UIU/ML (ref 0.34–5.6)
VLDLC SERPL-SCNC: 10 MG/DL

## 2022-01-11 PROCEDURE — 82043 UR ALBUMIN QUANTITATIVE: CPT

## 2022-01-11 PROCEDURE — 82728 ASSAY OF FERRITIN: CPT

## 2022-01-11 PROCEDURE — 83721 ASSAY OF BLOOD LIPOPROTEIN: CPT

## 2022-01-11 PROCEDURE — 85025 COMPLETE CBC W/AUTO DIFF WBC: CPT

## 2022-01-11 PROCEDURE — 83002 ASSAY OF GONADOTROPIN (LH): CPT

## 2022-01-11 PROCEDURE — 84403 ASSAY OF TOTAL TESTOSTERONE: CPT

## 2022-01-11 PROCEDURE — 84146 ASSAY OF PROLACTIN: CPT

## 2022-01-11 PROCEDURE — 84466 ASSAY OF TRANSFERRIN: CPT

## 2022-01-11 PROCEDURE — 83540 ASSAY OF IRON: CPT

## 2022-01-11 PROCEDURE — 80061 LIPID PANEL: CPT

## 2022-01-11 PROCEDURE — 80053 COMPREHEN METABOLIC PANEL: CPT

## 2022-01-11 PROCEDURE — 82570 ASSAY OF URINE CREATININE: CPT

## 2022-01-11 PROCEDURE — 36415 COLL VENOUS BLD VENIPUNCTURE: CPT

## 2022-01-11 PROCEDURE — 84443 ASSAY THYROID STIM HORMONE: CPT

## 2022-01-11 PROCEDURE — 83036 HEMOGLOBIN GLYCOSYLATED A1C: CPT

## 2022-02-18 ENCOUNTER — HOSPITAL ENCOUNTER (OUTPATIENT)
Dept: HOSPITAL 76 - DI | Age: 27
Discharge: HOME | End: 2022-02-18
Attending: INTERNAL MEDICINE
Payer: MEDICAID

## 2022-02-18 DIAGNOSIS — R79.89: Primary | ICD-10-CM

## 2022-02-18 NOTE — ULTRASOUND REPORT
PROCEDURE:  Abdomen Complete

 

INDICATIONS:  ELEVATED LIVER FUNCTION TESTS

 

TECHNIQUE:  

Real-time scanning was performed of the abdominal and retroperitoneal organs, with image documentatio
n.  

 

COMPARISON:  None.

 

FINDINGS:

  AORTA: The visualized abdominal aorta is normal.

 

  IVC: The visualized IVC is normal.

 

  LIVER: Normal contour. Mild increased echogenicity. Measures 14.8 cm in length. The portal vein is 
patent.

 

  PANCREAS: The visualized portions of the pancreas are normal.

 

  Gallbladder and biliary tree: Within normal limits. The common bile duct measures 2.9 mm.

 

  RIGHT KIDNEY: Normal in appearance with no hydronephrosis. Measuring 11.3 cm in length. The renal c
ortex thickness measures 1.7 cm.

 

  LEFT KIDNEY: Normal in appearance with no hydronephrosis. Measuring 12.7 cm in length. The renal co
rtex thickness measures 1.6 cm. 

  

  Spleen: Normal appearance, measuring 10.8 cm. 

 

No evidence for ascites.

 

IMPRESSION:

1.No significant abnormality.

 

    

 

Reviewed by: Kuldeep Lopez MD on 2/18/2022 9:00 AM PST

Approved by: Kuldeep Lopez MD on 2/18/2022 9:00 AM [facility]

 

 

Station ID:  [id number]